# Patient Record
Sex: FEMALE | NOT HISPANIC OR LATINO | Employment: FULL TIME | URBAN - METROPOLITAN AREA
[De-identification: names, ages, dates, MRNs, and addresses within clinical notes are randomized per-mention and may not be internally consistent; named-entity substitution may affect disease eponyms.]

---

## 2017-06-20 RX ORDER — ALBUTEROL SULFATE 90 UG/1
2 AEROSOL, METERED RESPIRATORY (INHALATION) EVERY 4 HOURS PRN
Qty: 1 INHALER | Refills: 6 | Status: SHIPPED | OUTPATIENT
Start: 2017-06-20 | End: 2018-11-14 | Stop reason: HOSPADM

## 2017-11-15 ENCOUNTER — TELEPHONE (OUTPATIENT)
Dept: INTERNAL MEDICINE | Facility: CLINIC | Age: 32
End: 2017-11-15

## 2017-11-15 DIAGNOSIS — Z00.00 HEALTHCARE MAINTENANCE: Primary | ICD-10-CM

## 2017-11-15 NOTE — TELEPHONE ENCOUNTER
LABS ORDERED    ----- Message from Tsering Phillips MD sent at 11/14/2017  3:04 PM EST -----  cmp flp tsh cbc  ----- Message -----     From: Janine Fletcher MA     Sent: 11/14/2017   2:16 PM       To: Tsering Phillips MD    PT IS SCHEDULED FOR LABS PLEASE ADVISE

## 2017-11-16 LAB
ALBUMIN SERPL-MCNC: 4.4 G/DL (ref 3.5–5.2)
ALBUMIN/GLOB SERPL: 1.9 G/DL
ALP SERPL-CCNC: 47 U/L (ref 39–117)
ALT SERPL-CCNC: 22 U/L (ref 1–33)
AST SERPL-CCNC: 21 U/L (ref 1–32)
BASOPHILS # BLD AUTO: 0.03 10*3/MM3 (ref 0–0.2)
BASOPHILS NFR BLD AUTO: 0.4 % (ref 0–1.5)
BILIRUB SERPL-MCNC: 0.9 MG/DL (ref 0.1–1.2)
BUN SERPL-MCNC: 12 MG/DL (ref 6–20)
BUN/CREAT SERPL: 15.8 (ref 7–25)
CALCIUM SERPL-MCNC: 9.5 MG/DL (ref 8.6–10.5)
CHLORIDE SERPL-SCNC: 102 MMOL/L (ref 98–107)
CHOLEST SERPL-MCNC: 158 MG/DL (ref 0–200)
CO2 SERPL-SCNC: 27.5 MMOL/L (ref 22–29)
CREAT SERPL-MCNC: 0.76 MG/DL (ref 0.57–1)
EOSINOPHIL # BLD AUTO: 0.43 10*3/MM3 (ref 0–0.7)
EOSINOPHIL NFR BLD AUTO: 6.2 % (ref 0.3–6.2)
ERYTHROCYTE [DISTWIDTH] IN BLOOD BY AUTOMATED COUNT: 13.2 % (ref 11.7–13)
GFR SERPLBLD CREATININE-BSD FMLA CKD-EPI: 107 ML/MIN/1.73
GFR SERPLBLD CREATININE-BSD FMLA CKD-EPI: 88 ML/MIN/1.73
GLOBULIN SER CALC-MCNC: 2.3 GM/DL
GLUCOSE SERPL-MCNC: 90 MG/DL (ref 65–99)
HCT VFR BLD AUTO: 45.1 % (ref 35.6–45.5)
HDLC SERPL-MCNC: 86 MG/DL (ref 40–60)
HGB BLD-MCNC: 14.3 G/DL (ref 11.9–15.5)
IMM GRANULOCYTES # BLD: 0 10*3/MM3 (ref 0–0.03)
IMM GRANULOCYTES NFR BLD: 0 % (ref 0–0.5)
LDLC SERPL CALC-MCNC: 61 MG/DL (ref 0–100)
LDLC/HDLC SERPL: 0.71 {RATIO}
LYMPHOCYTES # BLD AUTO: 2.28 10*3/MM3 (ref 0.9–4.8)
LYMPHOCYTES NFR BLD AUTO: 32.7 % (ref 19.6–45.3)
MCH RBC QN AUTO: 29.9 PG (ref 26.9–32)
MCHC RBC AUTO-ENTMCNC: 31.7 G/DL (ref 32.4–36.3)
MCV RBC AUTO: 94.4 FL (ref 80.5–98.2)
MONOCYTES # BLD AUTO: 0.42 10*3/MM3 (ref 0.2–1.2)
MONOCYTES NFR BLD AUTO: 6 % (ref 5–12)
NEUTROPHILS # BLD AUTO: 3.81 10*3/MM3 (ref 1.9–8.1)
NEUTROPHILS NFR BLD AUTO: 54.7 % (ref 42.7–76)
PLATELET # BLD AUTO: 236 10*3/MM3 (ref 140–500)
POTASSIUM SERPL-SCNC: 4.5 MMOL/L (ref 3.5–5.2)
PROT SERPL-MCNC: 6.7 G/DL (ref 6–8.5)
RBC # BLD AUTO: 4.78 10*6/MM3 (ref 3.9–5.2)
SODIUM SERPL-SCNC: 141 MMOL/L (ref 136–145)
TRIGL SERPL-MCNC: 54 MG/DL (ref 0–150)
TSH SERPL DL<=0.005 MIU/L-ACNC: 1.92 MIU/ML (ref 0.27–4.2)
VLDLC SERPL CALC-MCNC: 10.8 MG/DL (ref 5–40)
WBC # BLD AUTO: 6.97 10*3/MM3 (ref 4.5–10.7)

## 2017-11-22 ENCOUNTER — OFFICE VISIT (OUTPATIENT)
Dept: INTERNAL MEDICINE | Facility: CLINIC | Age: 32
End: 2017-11-22

## 2017-11-22 VITALS
HEART RATE: 59 BPM | OXYGEN SATURATION: 97 % | SYSTOLIC BLOOD PRESSURE: 116 MMHG | WEIGHT: 136.3 LBS | BODY MASS INDEX: 21.9 KG/M2 | DIASTOLIC BLOOD PRESSURE: 78 MMHG | HEIGHT: 66 IN

## 2017-11-22 DIAGNOSIS — Z00.00 HEALTH CARE MAINTENANCE: Primary | ICD-10-CM

## 2017-11-22 PROCEDURE — 99395 PREV VISIT EST AGE 18-39: CPT | Performed by: INTERNAL MEDICINE

## 2017-11-22 NOTE — PROGRESS NOTES
"Subjective   Radha Diamond is a 32 y.o. female and is here for a comprehensive physical exam. The patient reports problems - infertility.  She is taking femara to help with getting pregnant.  She has been off OCP for 1 year      Pt is UTD with annual gyn exam and mammo sees gyn    Do you take any herbs or supplements that were not prescribed by a doctor? Prenatal vit      Social History: She has been eating a healthy diet  She does exercise reg    Social History     Social History   • Marital status:      Spouse name: KANE   • Number of children: 0   • Years of education: N/A     Occupational History   • INTERNAL MD      Social History Main Topics   • Smoking status: Never Smoker   • Smokeless tobacco: Not on file   • Alcohol use Yes      Comment: 5-7 glasses of wine weekly   • Drug use: Not on file   • Sexual activity: Yes     Partners: Male     Birth control/ protection: Ring      Comment:      Other Topics Concern   • Not on file     Social History Narrative       Family History:   Family History   Problem Relation Age of Onset   • Hyperlipidemia Mother    • Hypertension Father    • Diabetes Father    • Heart attack Maternal Grandmother    • Diabetes Maternal Grandmother    • COPD Maternal Grandmother    • Stroke Maternal Grandmother    • Macular degeneration Maternal Grandmother    • Heart attack Maternal Grandfather    • Diabetes Maternal Grandfather    • Stroke Maternal Grandfather    • Alcohol abuse Maternal Grandfather    • Breast cancer Maternal Aunt        Past Medical History:   Past Medical History:   Diagnosis Date   • Asthma    • Environmental allergies            Review of Systems    A comprehensive review of systems was negative.    Objective   /78 (BP Location: Left arm, Patient Position: Sitting, Cuff Size: Adult)  Pulse 59  Ht 66\" (167.6 cm)  Wt 136 lb 4.8 oz (61.8 kg)  SpO2 97%  BMI 22 kg/m2    General Appearance:    Alert, cooperative, no distress, appears stated age "   Head:    Normocephalic, without obvious abnormality, atraumatic   Eyes:    PERRL, conjunctiva/corneas clear, EOM's intact, fundi     benign, both eyes   Ears:    Normal TM's and external ear canals, both ears   Nose:   Nares normal, septum midline, mucosa normal, no drainage    or sinus tenderness   Throat:   Lips, mucosa, and tongue normal; teeth and gums normal   Neck:   Supple, symmetrical, trachea midline, no adenopathy;     thyroid:  no enlargement/tenderness/nodules; no carotid    bruit or JVD   Back:     Symmetric, no curvature, ROM normal, no CVA tenderness   Lungs:     Clear to auscultation bilaterally, respirations unlabored   Chest Wall:    No tenderness or deformity    Heart:    Regular rate and rhythm, S1 and S2 normal, no murmur, rub   or gallop       Abdomen:     Soft, non-tender, bowel sounds active all four quadrants,     no masses, no organomegaly           Extremities:   Extremities normal, atraumatic, no cyanosis or edema   Pulses:   2+ and symmetric all extremities   Skin:   Skin color, texture, turgor normal, no rashes or lesions   Lymph nodes:   Cervical, supraclavicular, and axillary nodes normal   Neurologic:   CNII-XII intact, normal strength, sensation and reflexes     throughout         Medications:   Current Outpatient Prescriptions:   •  albuterol (PROVENTIL HFA;VENTOLIN HFA) 108 (90 BASE) MCG/ACT inhaler, Inhale 2 puffs Every 4 (Four) Hours As Needed for Wheezing., Disp: 1 inhaler, Rfl: 6  •  ALLERGY SERUM INJECTION, Inject  under the skin 1 (One) Time Per Week., Disp: , Rfl:   •  EPINEPHrine (EPIPEN) 0.3 MG/0.3ML solution auto-injector injection, 1 (One) Time., Disp: , Rfl:   •  fluticasone (FLONASE) 50 MCG/ACT nasal spray, 2 sprays into each nostril Daily., Disp: , Rfl:   •  Letrozole (FEMARA PO), Take 5 mg by mouth Daily As Needed (days 3-7 of cycle)., Disp: , Rfl:   •  Progesterone 200 MG suppository, Insert 200 mg into the vagina At Night As Needed (after positive opk result).,  Disp: , Rfl:        Assessment/Plan   Healthy female exam.      1. Healthcare Maintenance:  2. Patient Counseling:  --Nutrition: Stressed importance of moderation in sodium/caffeine intake, saturated fat and cholesterol, caloric balance, sufficient intake of fresh fruits, vegetables, fiber, calcium and vit D  --Exercise: she does cardio and strenght  --Substance Abuse: no tob occa etoh  --Dental health: she does go to the dentist reg  --Immunizations reviewed. She has had her flu shot  --Discussed benefits of screening colonoscopy due at age 50  3. Infertility-  She is seeing gyn about this

## 2018-01-11 RX ORDER — OSELTAMIVIR PHOSPHATE 75 MG/1
75 CAPSULE ORAL DAILY
Qty: 10 CAPSULE | Refills: 0 | Status: SHIPPED | OUTPATIENT
Start: 2018-01-11 | End: 2018-11-14 | Stop reason: HOSPADM

## 2018-04-06 DIAGNOSIS — Z32.01 POSITIVE PREGNANCY TEST: Primary | ICD-10-CM

## 2018-04-07 LAB
ABO GROUP BLD: NORMAL
RH BLD: POSITIVE

## 2018-04-08 NOTE — PROGRESS NOTES
Order from Dr. Ellsworth were placed under my name as lab slip was not compatible with [facility]. See order from scanned into media from Dr. Sienna Ellsworth. Results were faxed to her office.

## 2018-04-09 DIAGNOSIS — Z32.01 POSITIVE PREGNANCY TEST: Primary | ICD-10-CM

## 2018-04-09 LAB
HCG INTACT+B SERPL-ACNC: NORMAL MIU/ML
HCG INTACT+B SERPL-ACNC: NORMAL MIU/ML
Lab: NORMAL
WRITTEN AUTHORIZATION: NORMAL

## 2018-04-30 LAB
EXTERNAL HEPATITIS B SURFACE ANTIGEN: NEGATIVE
EXTERNAL HEPATITIS C AB: NORMAL
EXTERNAL RUBELLA QUALITATIVE: NORMAL
EXTERNAL SYPHILIS RPR SCREEN: NORMAL
HIV1 P24 AG SERPL QL IA: NORMAL

## 2018-10-24 LAB — EXTERNAL GROUP B STREP ANTIGEN: NEGATIVE

## 2018-11-10 ENCOUNTER — HOSPITAL ENCOUNTER (INPATIENT)
Facility: HOSPITAL | Age: 33
LOS: 4 days | Discharge: HOME | End: 2018-11-14
Attending: OBSTETRICS & GYNECOLOGY | Admitting: OBSTETRICS & GYNECOLOGY

## 2018-11-10 DIAGNOSIS — Z34.90 PREGNANCY, UNSPECIFIED GESTATIONAL AGE: Primary | ICD-10-CM

## 2018-11-10 LAB
ABO GROUP BLD: NORMAL
BASOPHILS # BLD AUTO: 0.04 10*3/MM3 (ref 0–0.2)
BASOPHILS NFR BLD AUTO: 0.4 % (ref 0–1.5)
BLD GP AB SCN SERPL QL: NEGATIVE
DEPRECATED RDW RBC AUTO: 42.5 FL (ref 37–54)
EOSINOPHIL # BLD AUTO: 0.33 10*3/MM3 (ref 0–0.7)
EOSINOPHIL NFR BLD AUTO: 3.3 % (ref 0.3–6.2)
ERYTHROCYTE [DISTWIDTH] IN BLOOD BY AUTOMATED COUNT: 12.7 % (ref 11.7–13)
HCT VFR BLD AUTO: 39.2 % (ref 35.6–45.5)
HGB BLD-MCNC: 12.9 G/DL (ref 11.9–15.5)
IMM GRANULOCYTES # BLD: 0.04 10*3/MM3 (ref 0–0.03)
IMM GRANULOCYTES NFR BLD: 0.4 % (ref 0–0.5)
LYMPHOCYTES # BLD AUTO: 2.91 10*3/MM3 (ref 0.9–4.8)
LYMPHOCYTES NFR BLD AUTO: 28.9 % (ref 19.6–45.3)
MCH RBC QN AUTO: 30.6 PG (ref 26.9–32)
MCHC RBC AUTO-ENTMCNC: 32.9 G/DL (ref 32.4–36.3)
MCV RBC AUTO: 93.1 FL (ref 80.5–98.2)
MONOCYTES # BLD AUTO: 0.65 10*3/MM3 (ref 0.2–1.2)
MONOCYTES NFR BLD AUTO: 6.5 % (ref 5–12)
NEUTROPHILS # BLD AUTO: 6.1 10*3/MM3 (ref 1.9–8.1)
NEUTROPHILS NFR BLD AUTO: 60.5 % (ref 42.7–76)
PLATELET # BLD AUTO: 208 10*3/MM3 (ref 140–500)
PMV BLD AUTO: 11.3 FL (ref 6–12)
RBC # BLD AUTO: 4.21 10*6/MM3 (ref 3.9–5.2)
RH BLD: POSITIVE
T&S EXPIRATION DATE: NORMAL
WBC NRBC COR # BLD: 10.07 10*3/MM3 (ref 4.5–10.7)

## 2018-11-10 PROCEDURE — 85025 COMPLETE CBC W/AUTO DIFF WBC: CPT | Performed by: OBSTETRICS & GYNECOLOGY

## 2018-11-10 PROCEDURE — 86901 BLOOD TYPING SEROLOGIC RH(D): CPT | Performed by: OBSTETRICS & GYNECOLOGY

## 2018-11-10 PROCEDURE — 86900 BLOOD TYPING SEROLOGIC ABO: CPT | Performed by: OBSTETRICS & GYNECOLOGY

## 2018-11-10 PROCEDURE — 86850 RBC ANTIBODY SCREEN: CPT | Performed by: OBSTETRICS & GYNECOLOGY

## 2018-11-10 RX ORDER — SODIUM CHLORIDE 0.9 % (FLUSH) 0.9 %
3-10 SYRINGE (ML) INJECTION AS NEEDED
Status: DISCONTINUED | OUTPATIENT
Start: 2018-11-10 | End: 2018-11-12 | Stop reason: HOSPADM

## 2018-11-10 RX ORDER — MISOPROSTOL 200 UG/1
800 TABLET ORAL AS NEEDED
Status: CANCELLED | OUTPATIENT
Start: 2018-11-10

## 2018-11-10 RX ORDER — ONDANSETRON 2 MG/ML
4 INJECTION INTRAMUSCULAR; INTRAVENOUS EVERY 6 HOURS PRN
Status: DISCONTINUED | OUTPATIENT
Start: 2018-11-10 | End: 2018-11-12 | Stop reason: HOSPADM

## 2018-11-10 RX ORDER — ACETAMINOPHEN 325 MG/1
650 TABLET ORAL EVERY 4 HOURS PRN
Status: DISCONTINUED | OUTPATIENT
Start: 2018-11-10 | End: 2018-11-12 | Stop reason: HOSPADM

## 2018-11-10 RX ORDER — SODIUM CHLORIDE 0.9 % (FLUSH) 0.9 %
3 SYRINGE (ML) INJECTION EVERY 12 HOURS SCHEDULED
Status: DISCONTINUED | OUTPATIENT
Start: 2018-11-10 | End: 2018-11-12 | Stop reason: HOSPADM

## 2018-11-10 RX ORDER — CARBOPROST TROMETHAMINE 250 UG/ML
250 INJECTION, SOLUTION INTRAMUSCULAR AS NEEDED
Status: CANCELLED | OUTPATIENT
Start: 2018-11-10

## 2018-11-10 RX ORDER — OXYTOCIN-SODIUM CHLORIDE 0.9% IV SOLN 30 UNIT/500ML 30-0.9/5 UT/ML-%
999 SOLUTION INTRAVENOUS ONCE
Status: CANCELLED | OUTPATIENT
Start: 2018-11-10

## 2018-11-10 RX ORDER — OXYTOCIN-SODIUM CHLORIDE 0.9% IV SOLN 30 UNIT/500ML 30-0.9/5 UT/ML-%
125 SOLUTION INTRAVENOUS CONTINUOUS PRN
Status: CANCELLED | OUTPATIENT
Start: 2018-11-11

## 2018-11-10 RX ORDER — LIDOCAINE HYDROCHLORIDE 10 MG/ML
5 INJECTION, SOLUTION EPIDURAL; INFILTRATION; INTRACAUDAL; PERINEURAL AS NEEDED
Status: DISCONTINUED | OUTPATIENT
Start: 2018-11-10 | End: 2018-11-12 | Stop reason: HOSPADM

## 2018-11-10 RX ORDER — FAMOTIDINE 10 MG/ML
20 INJECTION, SOLUTION INTRAVENOUS EVERY 12 HOURS SCHEDULED
Status: DISCONTINUED | OUTPATIENT
Start: 2018-11-10 | End: 2018-11-12 | Stop reason: HOSPADM

## 2018-11-10 RX ORDER — OXYTOCIN-SODIUM CHLORIDE 0.9% IV SOLN 30 UNIT/500ML 30-0.9/5 UT/ML-%
250 SOLUTION INTRAVENOUS CONTINUOUS
Status: CANCELLED | OUTPATIENT
Start: 2018-11-10 | End: 2018-11-11

## 2018-11-10 RX ORDER — ONDANSETRON 4 MG/1
4 TABLET, FILM COATED ORAL EVERY 6 HOURS PRN
Status: DISCONTINUED | OUTPATIENT
Start: 2018-11-10 | End: 2018-11-12 | Stop reason: HOSPADM

## 2018-11-10 RX ORDER — FAMOTIDINE 20 MG/1
20 TABLET, FILM COATED ORAL EVERY 12 HOURS SCHEDULED
Status: DISCONTINUED | OUTPATIENT
Start: 2018-11-10 | End: 2018-11-12 | Stop reason: HOSPADM

## 2018-11-10 RX ORDER — METHYLERGONOVINE MALEATE 0.2 MG/ML
200 INJECTION INTRAVENOUS ONCE AS NEEDED
Status: CANCELLED | OUTPATIENT
Start: 2018-11-10

## 2018-11-10 RX ORDER — ONDANSETRON 4 MG/1
4 TABLET, ORALLY DISINTEGRATING ORAL EVERY 6 HOURS PRN
Status: DISCONTINUED | OUTPATIENT
Start: 2018-11-10 | End: 2018-11-12 | Stop reason: HOSPADM

## 2018-11-11 ENCOUNTER — ANESTHESIA EVENT (OUTPATIENT)
Dept: LABOR AND DELIVERY | Facility: HOSPITAL | Age: 33
End: 2018-11-11

## 2018-11-11 ENCOUNTER — ANESTHESIA (OUTPATIENT)
Dept: LABOR AND DELIVERY | Facility: HOSPITAL | Age: 33
End: 2018-11-11

## 2018-11-11 VITALS — DIASTOLIC BLOOD PRESSURE: 110 MMHG | OXYGEN SATURATION: 100 % | HEART RATE: 74 BPM | SYSTOLIC BLOOD PRESSURE: 136 MMHG

## 2018-11-11 LAB
ATMOSPHERIC PRESS: 758.7 MMHG
ATMOSPHERIC PRESS: 761.8 MMHG
BASE EXCESS BLDCOA CALC-SCNC: -9.8 MMOL/L
BASE EXCESS BLDCOV CALC-SCNC: -8.9 MMOL/L (ref -30–30)
BDY SITE: ABNORMAL
BDY SITE: ABNORMAL
HCO3 BLDCOA-SCNC: 24.1 MMOL/L (ref 22–28)
HCO3 BLDCOV-SCNC: 23.8 MMOL/L
MODALITY: ABNORMAL
MODALITY: ABNORMAL
NOTE: ABNORMAL
NOTE: ABNORMAL
PCO2 BLDCOA: 94.5 MMHG
PCO2 BLDCOV: 82 MM HG (ref 35–51.3)
PH BLDCOA: 7.01 PH UNITS (ref 7.18–7.34)
PH BLDCOV: 7.07 PH UNITS (ref 7.26–7.4)
PO2 BLDCOA: <10.9 MMHG (ref 12–26)
PO2 BLDCOV: 17.1 MM HG (ref 19–39)
SAO2 % BLDCOA: 12.9 % (ref 92–99)
SAO2 % BLDCOA: 3.7 % (ref 92–99)
SAO2 % BLDCOV: ABNORMAL %

## 2018-11-11 PROCEDURE — 88307 TISSUE EXAM BY PATHOLOGIST: CPT

## 2018-11-11 PROCEDURE — 25010000002 HYDROMORPHONE PER 4 MG: Performed by: ANESTHESIOLOGY

## 2018-11-11 PROCEDURE — 3E0E77Z INTRODUCTION OF ELECTROLYTIC AND WATER BALANCE SUBSTANCE INTO PRODUCTS OF CONCEPTION, VIA NATURAL OR ARTIFICIAL OPENING: ICD-10-PCS | Performed by: OBSTETRICS & GYNECOLOGY

## 2018-11-11 PROCEDURE — C1755 CATHETER, INTRASPINAL: HCPCS | Performed by: ANESTHESIOLOGY

## 2018-11-11 PROCEDURE — 25010000002 ONDANSETRON PER 1 MG: Performed by: OBSTETRICS & GYNECOLOGY

## 2018-11-11 PROCEDURE — 25010000002 MORPHINE PER 10 MG: Performed by: ANESTHESIOLOGY

## 2018-11-11 PROCEDURE — 25010000002 ROPIVACAINE PER 1 MG: Performed by: ANESTHESIOLOGY

## 2018-11-11 PROCEDURE — 82803 BLOOD GASES ANY COMBINATION: CPT

## 2018-11-11 PROCEDURE — 25010000003 CEFAZOLIN IN DEXTROSE 2-4 GM/100ML-% SOLUTION: Performed by: OBSTETRICS & GYNECOLOGY

## 2018-11-11 RX ORDER — MISOPROSTOL 200 UG/1
800 TABLET ORAL AS NEEDED
Status: DISCONTINUED | OUTPATIENT
Start: 2018-11-11 | End: 2018-11-12 | Stop reason: HOSPADM

## 2018-11-11 RX ORDER — OXYTOCIN-SODIUM CHLORIDE 0.9% IV SOLN 30 UNIT/500ML 30-0.9/5 UT/ML-%
250 SOLUTION INTRAVENOUS CONTINUOUS
Status: DISPENSED | OUTPATIENT
Start: 2018-11-11 | End: 2018-11-11

## 2018-11-11 RX ORDER — EPHEDRINE SULFATE 50 MG/ML
5 INJECTION, SOLUTION INTRAVENOUS AS NEEDED
Status: DISCONTINUED | OUTPATIENT
Start: 2018-11-11 | End: 2018-11-12 | Stop reason: HOSPADM

## 2018-11-11 RX ORDER — CEFAZOLIN SODIUM 2 G/100ML
2 INJECTION, SOLUTION INTRAVENOUS ONCE
Status: COMPLETED | OUTPATIENT
Start: 2018-11-11 | End: 2018-11-11

## 2018-11-11 RX ORDER — SODIUM CHLORIDE, SODIUM LACTATE, POTASSIUM CHLORIDE, CALCIUM CHLORIDE 600; 310; 30; 20 MG/100ML; MG/100ML; MG/100ML; MG/100ML
125 INJECTION, SOLUTION INTRAVENOUS CONTINUOUS
Status: DISCONTINUED | OUTPATIENT
Start: 2018-11-11 | End: 2018-11-14 | Stop reason: HOSPADM

## 2018-11-11 RX ORDER — NALOXONE HCL 0.4 MG/ML
0.2 VIAL (ML) INJECTION
Status: CANCELLED | OUTPATIENT
Start: 2018-11-11

## 2018-11-11 RX ORDER — ROPIVACAINE HYDROCHLORIDE 2 MG/ML
INJECTION, SOLUTION EPIDURAL; INFILTRATION; PERINEURAL AS NEEDED
Status: DISCONTINUED | OUTPATIENT
Start: 2018-11-11 | End: 2018-11-11 | Stop reason: SURG

## 2018-11-11 RX ORDER — ERYTHROMYCIN 5 MG/G
OINTMENT OPHTHALMIC
Status: DISPENSED
Start: 2018-11-11 | End: 2018-11-12

## 2018-11-11 RX ORDER — MORPHINE SULFATE 1 MG/ML
INJECTION, SOLUTION EPIDURAL; INTRATHECAL; INTRAVENOUS
Status: COMPLETED
Start: 2018-11-11 | End: 2018-11-11

## 2018-11-11 RX ORDER — LIDOCAINE HYDROCHLORIDE 10 MG/ML
5 INJECTION, SOLUTION EPIDURAL; INFILTRATION; INTRACAUDAL; PERINEURAL AS NEEDED
Status: DISCONTINUED | OUTPATIENT
Start: 2018-11-11 | End: 2018-11-12 | Stop reason: HOSPADM

## 2018-11-11 RX ORDER — PHYTONADIONE 1 MG/.5ML
INJECTION, EMULSION INTRAMUSCULAR; INTRAVENOUS; SUBCUTANEOUS
Status: DISPENSED
Start: 2018-11-11 | End: 2018-11-12

## 2018-11-11 RX ORDER — FAMOTIDINE 10 MG/ML
20 INJECTION, SOLUTION INTRAVENOUS ONCE AS NEEDED
Status: DISCONTINUED | OUTPATIENT
Start: 2018-11-11 | End: 2018-11-12 | Stop reason: HOSPADM

## 2018-11-11 RX ORDER — SODIUM CHLORIDE 0.9 % (FLUSH) 0.9 %
3-10 SYRINGE (ML) INJECTION AS NEEDED
Status: DISCONTINUED | OUTPATIENT
Start: 2018-11-11 | End: 2018-11-12 | Stop reason: HOSPADM

## 2018-11-11 RX ORDER — ONDANSETRON 2 MG/ML
4 INJECTION INTRAMUSCULAR; INTRAVENOUS ONCE AS NEEDED
Status: DISCONTINUED | OUTPATIENT
Start: 2018-11-11 | End: 2018-11-12 | Stop reason: HOSPADM

## 2018-11-11 RX ORDER — OXYTOCIN-SODIUM CHLORIDE 0.9% IV SOLN 30 UNIT/500ML 30-0.9/5 UT/ML-%
999 SOLUTION INTRAVENOUS ONCE
Status: DISCONTINUED | OUTPATIENT
Start: 2018-11-11 | End: 2018-11-12 | Stop reason: HOSPADM

## 2018-11-11 RX ORDER — CARBOPROST TROMETHAMINE 250 UG/ML
250 INJECTION, SOLUTION INTRAMUSCULAR AS NEEDED
Status: DISCONTINUED | OUTPATIENT
Start: 2018-11-11 | End: 2018-11-12 | Stop reason: HOSPADM

## 2018-11-11 RX ORDER — DEXTROSE, SODIUM CHLORIDE, SODIUM LACTATE, POTASSIUM CHLORIDE, AND CALCIUM CHLORIDE 5; .6; .31; .03; .02 G/100ML; G/100ML; G/100ML; G/100ML; G/100ML
125 INJECTION, SOLUTION INTRAVENOUS CONTINUOUS
Status: ACTIVE | OUTPATIENT
Start: 2018-11-11 | End: 2018-11-12

## 2018-11-11 RX ORDER — LIDOCAINE HYDROCHLORIDE AND EPINEPHRINE 20; 5 MG/ML; UG/ML
INJECTION, SOLUTION EPIDURAL; INFILTRATION; INTRACAUDAL; PERINEURAL AS NEEDED
Status: DISCONTINUED | OUTPATIENT
Start: 2018-11-11 | End: 2018-11-11 | Stop reason: SURG

## 2018-11-11 RX ORDER — SODIUM CHLORIDE 0.9 % (FLUSH) 0.9 %
3 SYRINGE (ML) INJECTION EVERY 12 HOURS SCHEDULED
Status: DISCONTINUED | OUTPATIENT
Start: 2018-11-11 | End: 2018-11-12 | Stop reason: HOSPADM

## 2018-11-11 RX ORDER — OXYTOCIN-SODIUM CHLORIDE 0.9% IV SOLN 30 UNIT/500ML 30-0.9/5 UT/ML-%
2-30 SOLUTION INTRAVENOUS
Status: DISCONTINUED | OUTPATIENT
Start: 2018-11-11 | End: 2018-11-14 | Stop reason: HOSPADM

## 2018-11-11 RX ORDER — MORPHINE SULFATE 1 MG/ML
INJECTION, SOLUTION EPIDURAL; INTRATHECAL; INTRAVENOUS AS NEEDED
Status: DISCONTINUED | OUTPATIENT
Start: 2018-11-11 | End: 2018-11-11 | Stop reason: SURG

## 2018-11-11 RX ORDER — METHYLERGONOVINE MALEATE 0.2 MG/ML
200 INJECTION INTRAVENOUS AS NEEDED
Status: DISCONTINUED | OUTPATIENT
Start: 2018-11-11 | End: 2018-11-12 | Stop reason: HOSPADM

## 2018-11-11 RX ORDER — OXYTOCIN-SODIUM CHLORIDE 0.9% IV SOLN 30 UNIT/500ML 30-0.9/5 UT/ML-%
125 SOLUTION INTRAVENOUS CONTINUOUS PRN
Status: COMPLETED | OUTPATIENT
Start: 2018-11-12 | End: 2018-11-11

## 2018-11-11 RX ORDER — HYDROMORPHONE HYDROCHLORIDE 1 MG/ML
0.5 INJECTION, SOLUTION INTRAMUSCULAR; INTRAVENOUS; SUBCUTANEOUS
Status: DISPENSED | OUTPATIENT
Start: 2018-11-11 | End: 2018-11-12

## 2018-11-11 RX ORDER — SODIUM CHLORIDE 9 MG/ML
100 INJECTION, SOLUTION INTRAVENOUS CONTINUOUS
Status: DISCONTINUED | OUTPATIENT
Start: 2018-11-11 | End: 2018-11-14 | Stop reason: HOSPADM

## 2018-11-11 RX ADMIN — SUFENTANIL CITRATE 10 ML: 50 INJECTION EPIDURAL; INTRAVENOUS at 19:47

## 2018-11-11 RX ADMIN — SODIUM CHLORIDE 300 ML: 9 INJECTION, SOLUTION INTRAVENOUS at 15:21

## 2018-11-11 RX ADMIN — AZITHROMYCIN DIHYDRATE 500 MG: 500 INJECTION, POWDER, LYOPHILIZED, FOR SOLUTION INTRAVENOUS at 22:39

## 2018-11-11 RX ADMIN — OXYTOCIN 999 ML/HR: 10 INJECTION, SOLUTION INTRAMUSCULAR; INTRAVENOUS at 22:19

## 2018-11-11 RX ADMIN — LIDOCAINE HYDROCHLORIDE,EPINEPHRINE BITARTRATE 15 ML: 20; .005 INJECTION, SOLUTION EPIDURAL; INFILTRATION; INTRACAUDAL; PERINEURAL at 22:00

## 2018-11-11 RX ADMIN — ONDANSETRON 4 MG: 2 INJECTION INTRAMUSCULAR; INTRAVENOUS at 22:22

## 2018-11-11 RX ADMIN — SODIUM CHLORIDE, POTASSIUM CHLORIDE, SODIUM LACTATE AND CALCIUM CHLORIDE: 600; 310; 30; 20 INJECTION, SOLUTION INTRAVENOUS at 22:15

## 2018-11-11 RX ADMIN — SODIUM CHLORIDE, POTASSIUM CHLORIDE, SODIUM LACTATE AND CALCIUM CHLORIDE 125 ML/HR: 600; 310; 30; 20 INJECTION, SOLUTION INTRAVENOUS at 10:39

## 2018-11-11 RX ADMIN — SODIUM CHLORIDE 300 ML: 9 INJECTION, SOLUTION INTRAVENOUS at 19:05

## 2018-11-11 RX ADMIN — OXYTOCIN 2 MILLI-UNITS/MIN: 10 INJECTION, SOLUTION INTRAMUSCULAR; INTRAVENOUS at 10:40

## 2018-11-11 RX ADMIN — SODIUM CHLORIDE, POTASSIUM CHLORIDE, SODIUM LACTATE AND CALCIUM CHLORIDE 125 ML/HR: 600; 310; 30; 20 INJECTION, SOLUTION INTRAVENOUS at 21:39

## 2018-11-11 RX ADMIN — SODIUM CHLORIDE, POTASSIUM CHLORIDE, SODIUM LACTATE AND CALCIUM CHLORIDE 999 ML/HR: 600; 310; 30; 20 INJECTION, SOLUTION INTRAVENOUS at 19:05

## 2018-11-11 RX ADMIN — ROPIVACAINE HYDROCHLORIDE 4 ML: 2 INJECTION, SOLUTION EPIDURAL; INFILTRATION at 19:41

## 2018-11-11 RX ADMIN — MORPHINE SULFATE 5 MG: 1 INJECTION EPIDURAL; INTRATHECAL; INTRAVENOUS at 22:47

## 2018-11-11 RX ADMIN — ROPIVACAINE HYDROCHLORIDE 4 ML: 2 INJECTION, SOLUTION EPIDURAL; INFILTRATION at 19:43

## 2018-11-11 RX ADMIN — SODIUM CHLORIDE, SODIUM LACTATE, POTASSIUM CHLORIDE, CALCIUM CHLORIDE AND DEXTROSE MONOHYDRATE 125 ML/HR: 5; 600; 310; 30; 20 INJECTION, SOLUTION INTRAVENOUS at 15:29

## 2018-11-11 RX ADMIN — ROPIVACAINE HYDROCHLORIDE 5 ML: 2 INJECTION, SOLUTION EPIDURAL; INFILTRATION at 19:39

## 2018-11-11 RX ADMIN — Medication 3 ML: at 07:19

## 2018-11-11 RX ADMIN — HYDROMORPHONE HYDROCHLORIDE 0.5 MG: 1 INJECTION, SOLUTION INTRAMUSCULAR; INTRAVENOUS; SUBCUTANEOUS at 23:43

## 2018-11-11 RX ADMIN — CEFAZOLIN SODIUM 2 G: 2 INJECTION, SOLUTION INTRAVENOUS at 21:57

## 2018-11-11 RX ADMIN — OXYTOCIN 125 ML/HR: 10 INJECTION, SOLUTION INTRAMUSCULAR; INTRAVENOUS at 23:45

## 2018-11-11 NOTE — NURSING NOTE
RI To Dr Meyer. Informed that pt came in last evening around 9pm, SROM around 230pm yesterday, afebrile. Pt has not been check since around 10pm yesterday. Spontaneous decel noted after going to [facility] last time, pt asking for intermittent monitoring and ability to go walking.FHR after decel returned to baseline and was reactive. Noted that pt is currently having a decel. MD on her way to LD now and will discuss with pt. Note started at 0915.  Johnnie Morris RN

## 2018-11-11 NOTE — NURSING NOTE
Pt turned to left side. Pt had been standing at bedside. I discussed with pt and  the decels and position changes to help improve those. I also discussed possibility of amnioinfusion to help cord buyoncy

## 2018-11-11 NOTE — NURSING NOTE
Dr Meyer at desk. Noted that pitocin orders have been rec'd, RN about to start. MD does not want sve performed prior to pitocin aug.  Johnnie Morris RN

## 2018-11-11 NOTE — H&P
[facility]  Obstetric History and Physical    Chief Complaint   Patient presents with   • Rupture of Membranes     rupture at 230pm, clar fluid, large amount, no odor, no vaginal bleeding.       Subjective     Patient is a 33 y.o. female  currently at 38w1d, who presents with ruptured membranes and few contractions.  Membranes ruptured about 1430 today.    Her prenatal care is benign.  Her previous obstetric/gynecological history is noted for is non-contributory.    The following portions of the patients history were reviewed and updated as appropriate: current medications, allergies, past medical history, past surgical history, past family history and past social history .       Prenatal Information:  Prenatal Results     Initial Prenatal Labs     Test Value Reference Range Date Time    Hemoglobin        Hematocrit        Platelets 236 10*3/mm3 140 - 500 10*3/mm3 11/15/17 1530    Rubella IgG        Hepatitis B SAg        Hepatitis C Ab        RPR        ABO O   18 1334    Rh Positive   18 1334    Antibody Screen        HIV        Urine Culture        Gonorrhea        Chlamydia        TSH 1.92 mIU/mL 0.27 - 4.2 mIU/mL 11/15/17 1530          2nd and 3rd Trimester     Test Value Reference Range Date Time    Hemoglobin (repeated)        Hematocrit (repeated)        GCT        Antibody Screen (repeated)        GTT Fasting        GTT 1 Hr        GTT 2 Hr        GTT 3 Hr        Group B Strep              Drug Screening     Test Value Reference Range Date Time    Amphetamine Screen        Barbiturate Screen        Benzodiazepine Screen        Methadone Screen        Phencyclidine Screen        Opiates Screen        THC Screen        Cocaine Screen        Propoxyphene Screen        Buprenorphine Screen        Methamphetamine Screen        Oxycodone Screen        Tryicyclic Antidepressants Screen              Other (Risk screening)     Test Value Reference Range Date Time    Varicella IgG        Parvovirus  IgG        CMV IgG        Cystic Fibrosis        Hemoglobin electrophoresis        NIPT        MSAFP-4        AFP (for NTD only)                  External Prenatal Results     Pregnancy Outside Results - Transcribed From Office Records - See Scanned Records For Details     Test Value Date Time    Hgb 14.3 g/dL 11/15/17 1530    Hct 45.1 % 11/15/17 1530    ABO O  18 1334    Rh Positive  18 1334    Antibody Screen       Glucose Fasting GTT       Glucose Tolerance Test 1 hour       Glucose Tolerance Test 3 hour       Gonorrhea (discrete)       Chlamydia (discrete)       RPR       VDRL       Syphilis Antibody       Rubella       HBsAg       Herpes Simplex Virus PCR       Herpes Simplex VIrus Culture       HIV       Hep C RNA Quant PCR       Hep C Antibody       AFP       Group B Strep       GBS Susceptibility to Clindamycin       GBS Susceptibility to Erythromycin       Fetal Fibronectin       Genetic Testing, Maternal Blood             Drug Screening     Test Value Date Time    Urine Drug Screen       Amphetamine Screen       Barbiturate Screen       Benzodiazepine Screen       Methadone Screen       Phencyclidine Screen       Opiates Screen       THC Screen       Cocaine Screen       Propoxyphene Screen       Buprenorphine Screen       Methamphetamine Screen       Oxycodone Screen       Tricyclic Antidepressants Screen                    Past OB History:     Obstetric History       T0      L0     SAB0   TAB0   Ectopic0   Molar0   Multiple0   Live Births0       # Outcome Date GA Lbr Vikas/2nd Weight Sex Delivery Anes PTL Lv   1 Current                   Past Medical History: Past Medical History:   Diagnosis Date   • Asthma    • Environmental allergies       Past Surgical History Past Surgical History:   Procedure Laterality Date   • ENDOSCOPY     • HYSTEROSALPINGOGRAM  2017   • SINUS SURGERY     • WISDOM TOOTH EXTRACTION        Family History: Family History   Problem Relation Age of Onset    • Hyperlipidemia Mother    • Hypertension Father    • Diabetes Father    • Heart attack Maternal Grandmother    • Diabetes Maternal Grandmother    • COPD Maternal Grandmother    • Stroke Maternal Grandmother    • Macular degeneration Maternal Grandmother    • Heart attack Maternal Grandfather    • Diabetes Maternal Grandfather    • Stroke Maternal Grandfather    • Alcohol abuse Maternal Grandfather    • Breast cancer Maternal Aunt       Social History:  reports that  has never smoked. she has never used smokeless tobacco.   reports that she drinks alcohol.   reports that she does not use drugs.        General ROS: Pertinent items are noted in HPI    Objective       Vital Signs Range for the last 24 hours  Temperature: Temp:  [97.9 °F (36.6 °C)] 97.9 °F (36.6 °C)   Temp Source: Temp src: Oral   BP: BP: (113-147)/(58-88) 114/58   Pulse: Heart Rate:  [52-60] 52   Respirations: Resp:  [16] 16   SPO2:     O2 Amount (l/min):     O2 Devices     Weight: Weight:  [81.2 kg (179 lb)-81.9 kg (180 lb 10.3 oz)] 81.9 kg (180 lb 10.3 oz)     Physical Examination: General appearance - alert, well appearing, and in no distress  Chest - clear to auscultation, no wheezes, rales or rhonchi, symmetric air entry  Heart - normal rate, regular rhythm, normal S1, S2, no murmurs, rubs, clicks or gallops  Abdomen - soft, nontender, gravid, no masses or organomegaly  Pelvic - normal external genitalia, vulva, vagina, cervix, uterus and adnexa    Presentation: cephalic   Cervix: Exam by: Method: sterile exam per RN   Dilation: Cervical Dilation (cm): 0-1   Effacement: Cervical Effacement: 50%   Station:         Fetal Heart Rate Assessment   Method: Fetal HR Assessment Method: external   Beats/min: Fetal HR (beats/min): 125   Baseline: Fetal Heart Baseline Rate: normal range   Variability: Fetal HR Variability: moderate (amplitude range 6 to 25 bpm)   Accels: Fetal HR Accelerations: greater than/equal to 15 bpm, lasting at least 15 seconds    Decels: Fetal HR Decelerations: absent   Tracing Category:       Uterine Assessment   Method: Method: external tocotransducer   Frequency (min): Contraction Frequency (Minutes): every 14 minute at home   Ctx Count in 10 min:     Duration:     Intensity: Contraction Intensity: no contractions   Intensity by IUPC:     Resting Tone: Uterine Resting Tone: soft by palpation   Resting Tone by IUPC:     Tito Units:           Assessment/Plan       Pregnancy        Assessment:  1.  Intrauterine pregnancy at 38w1d gestation with reassuring fetal status.    2.  Ruptured membranes, not in labor, unfavorable cervix, hoping for unmedicated birth.  3.  Obstetrical history significant for is non-contributory.  4.  GBS status: Negative    Plan:  1. Admit, may ambulate  2. Plan of care has been reviewed with patient and .  3.  Risks, benefits of treatment plan have been discussed.  4.  All questions have been answered.      Merly Rivas MD  11/10/2018  11:58 PM

## 2018-11-11 NOTE — PROGRESS NOTES
SROM yesterday at 230pm. Only cervical exam on admission was fingertip to 1.    Not sadaf. Tried nipple stim overnight but didn't do much.  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    gbs neg- per cdc guidelines, no antibx at this time even though ROM over 18 hrs.    fht- cat 1. Overnight had a couple minor variables when standing.    toco- rare contn.      Long d/w pt/fob. Even though they have a birth plan wanting unmedicated and noninterventional as much as possible but both have ultimate goal of healthy mom and healthy baby and understand that often need medical intervention. Not opposed to epidural but wants to try other things first.    D/w recommend pitocin to get into labor. D/w and decided not to examine cervix since do not expect cervical change since not contracted all night.

## 2018-11-11 NOTE — PROGRESS NOTES
Still leaking fluid.     fht- run of repetetive fht decels- more late. Good variability.    Pit turned off, started oxyen.    cvx 2+/70/-2/ mid/ soft.... IUPC placed.      FHT decels- D/w pt/fob pathophys of fetal monitoring. Will try corrective measures. amnio infusion. D/w if fetal intolerance this early in labor, concerning for need for csection. Once recovers for 30 min, will try to restart pit

## 2018-11-12 LAB
BASOPHILS # BLD AUTO: 0.02 10*3/MM3 (ref 0–0.2)
BASOPHILS NFR BLD AUTO: 0.1 % (ref 0–1.5)
DEPRECATED RDW RBC AUTO: 43.7 FL (ref 37–54)
EOSINOPHIL # BLD AUTO: 0.01 10*3/MM3 (ref 0–0.7)
EOSINOPHIL NFR BLD AUTO: 0.1 % (ref 0.3–6.2)
ERYTHROCYTE [DISTWIDTH] IN BLOOD BY AUTOMATED COUNT: 12.8 % (ref 11.7–13)
HCT VFR BLD AUTO: 38.3 % (ref 35.6–45.5)
HGB BLD-MCNC: 12.2 G/DL (ref 11.9–15.5)
IMM GRANULOCYTES # BLD: 0.07 10*3/MM3 (ref 0–0.03)
IMM GRANULOCYTES NFR BLD: 0.4 % (ref 0–0.5)
LYMPHOCYTES # BLD AUTO: 1.8 10*3/MM3 (ref 0.9–4.8)
LYMPHOCYTES NFR BLD AUTO: 10 % (ref 19.6–45.3)
MCH RBC QN AUTO: 30 PG (ref 26.9–32)
MCHC RBC AUTO-ENTMCNC: 31.9 G/DL (ref 32.4–36.3)
MCV RBC AUTO: 94.3 FL (ref 80.5–98.2)
MONOCYTES # BLD AUTO: 1.13 10*3/MM3 (ref 0.2–1.2)
MONOCYTES NFR BLD AUTO: 6.3 % (ref 5–12)
NEUTROPHILS # BLD AUTO: 15.05 10*3/MM3 (ref 1.9–8.1)
NEUTROPHILS NFR BLD AUTO: 83.1 % (ref 42.7–76)
PLATELET # BLD AUTO: 167 10*3/MM3 (ref 140–500)
PMV BLD AUTO: 10.8 FL (ref 6–12)
RBC # BLD AUTO: 4.06 10*6/MM3 (ref 3.9–5.2)
WBC NRBC COR # BLD: 18.08 10*3/MM3 (ref 4.5–10.7)

## 2018-11-12 PROCEDURE — 85025 COMPLETE CBC W/AUTO DIFF WBC: CPT | Performed by: OBSTETRICS & GYNECOLOGY

## 2018-11-12 RX ORDER — ONDANSETRON 2 MG/ML
4 INJECTION INTRAMUSCULAR; INTRAVENOUS ONCE AS NEEDED
Status: DISCONTINUED | OUTPATIENT
Start: 2018-11-12 | End: 2018-11-14 | Stop reason: HOSPADM

## 2018-11-12 RX ORDER — MISOPROSTOL 200 UG/1
600 TABLET ORAL ONCE AS NEEDED
Status: DISCONTINUED | OUTPATIENT
Start: 2018-11-12 | End: 2018-11-14 | Stop reason: HOSPADM

## 2018-11-12 RX ORDER — OXYCODONE HYDROCHLORIDE AND ACETAMINOPHEN 5; 325 MG/1; MG/1
2 TABLET ORAL EVERY 4 HOURS PRN
Status: DISCONTINUED | OUTPATIENT
Start: 2018-11-12 | End: 2018-11-14 | Stop reason: HOSPADM

## 2018-11-12 RX ORDER — SIMETHICONE 80 MG
80 TABLET,CHEWABLE ORAL 4 TIMES DAILY PRN
Status: DISCONTINUED | OUTPATIENT
Start: 2018-11-12 | End: 2018-11-14 | Stop reason: HOSPADM

## 2018-11-12 RX ORDER — PROMETHAZINE HYDROCHLORIDE 25 MG/1
12.5 SUPPOSITORY RECTAL EVERY 6 HOURS PRN
Status: DISCONTINUED | OUTPATIENT
Start: 2018-11-12 | End: 2018-11-14 | Stop reason: HOSPADM

## 2018-11-12 RX ORDER — OXYCODONE HYDROCHLORIDE AND ACETAMINOPHEN 5; 325 MG/1; MG/1
1 TABLET ORAL EVERY 4 HOURS PRN
Status: DISCONTINUED | OUTPATIENT
Start: 2018-11-12 | End: 2018-11-14 | Stop reason: HOSPADM

## 2018-11-12 RX ORDER — SODIUM CHLORIDE 0.9 % (FLUSH) 0.9 %
1-10 SYRINGE (ML) INJECTION AS NEEDED
Status: DISCONTINUED | OUTPATIENT
Start: 2018-11-12 | End: 2018-11-14 | Stop reason: HOSPADM

## 2018-11-12 RX ORDER — PROMETHAZINE HYDROCHLORIDE 25 MG/1
25 TABLET ORAL EVERY 6 HOURS PRN
Status: DISCONTINUED | OUTPATIENT
Start: 2018-11-12 | End: 2018-11-14 | Stop reason: HOSPADM

## 2018-11-12 RX ORDER — DIPHENHYDRAMINE HCL 25 MG
25 CAPSULE ORAL EVERY 4 HOURS PRN
Status: DISCONTINUED | OUTPATIENT
Start: 2018-11-12 | End: 2018-11-14 | Stop reason: HOSPADM

## 2018-11-12 RX ORDER — ONDANSETRON 4 MG/1
4 TABLET, FILM COATED ORAL EVERY 6 HOURS PRN
Status: DISCONTINUED | OUTPATIENT
Start: 2018-11-12 | End: 2018-11-14 | Stop reason: HOSPADM

## 2018-11-12 RX ORDER — PROMETHAZINE HYDROCHLORIDE 25 MG/ML
12.5 INJECTION, SOLUTION INTRAMUSCULAR; INTRAVENOUS EVERY 6 HOURS PRN
Status: DISCONTINUED | OUTPATIENT
Start: 2018-11-12 | End: 2018-11-14 | Stop reason: HOSPADM

## 2018-11-12 RX ORDER — ACETAMINOPHEN 325 MG/1
650 TABLET ORAL EVERY 4 HOURS PRN
Status: DISCONTINUED | OUTPATIENT
Start: 2018-11-12 | End: 2018-11-14 | Stop reason: HOSPADM

## 2018-11-12 RX ORDER — MORPHINE SULFATE 2 MG/ML
2 INJECTION, SOLUTION INTRAMUSCULAR; INTRAVENOUS
Status: ACTIVE | OUTPATIENT
Start: 2018-11-12 | End: 2018-11-13

## 2018-11-12 RX ORDER — IBUPROFEN 800 MG/1
800 TABLET ORAL EVERY 8 HOURS PRN
Status: DISCONTINUED | OUTPATIENT
Start: 2018-11-12 | End: 2018-11-14 | Stop reason: HOSPADM

## 2018-11-12 RX ORDER — DOCUSATE SODIUM 100 MG/1
100 CAPSULE, LIQUID FILLED ORAL 2 TIMES DAILY PRN
Status: DISCONTINUED | OUTPATIENT
Start: 2018-11-12 | End: 2018-11-14 | Stop reason: HOSPADM

## 2018-11-12 RX ORDER — SODIUM CHLORIDE 0.9 % (FLUSH) 0.9 %
3 SYRINGE (ML) INJECTION EVERY 12 HOURS SCHEDULED
Status: DISCONTINUED | OUTPATIENT
Start: 2018-11-12 | End: 2018-11-14 | Stop reason: HOSPADM

## 2018-11-12 RX ORDER — ONDANSETRON 4 MG/1
4 TABLET, ORALLY DISINTEGRATING ORAL EVERY 6 HOURS PRN
Status: DISCONTINUED | OUTPATIENT
Start: 2018-11-12 | End: 2018-11-14 | Stop reason: HOSPADM

## 2018-11-12 RX ORDER — ONDANSETRON 2 MG/ML
4 INJECTION INTRAMUSCULAR; INTRAVENOUS EVERY 6 HOURS PRN
Status: DISCONTINUED | OUTPATIENT
Start: 2018-11-12 | End: 2018-11-14 | Stop reason: HOSPADM

## 2018-11-12 RX ORDER — BISACODYL 10 MG
10 SUPPOSITORY, RECTAL RECTAL DAILY PRN
Status: DISCONTINUED | OUTPATIENT
Start: 2018-11-12 | End: 2018-11-14 | Stop reason: HOSPADM

## 2018-11-12 RX ORDER — NALOXONE HCL 0.4 MG/ML
0.4 VIAL (ML) INJECTION
Status: DISCONTINUED | OUTPATIENT
Start: 2018-11-12 | End: 2018-11-14 | Stop reason: HOSPADM

## 2018-11-12 RX ORDER — MORPHINE SULFATE 2 MG/ML
2 INJECTION, SOLUTION INTRAMUSCULAR; INTRAVENOUS EVERY 4 HOURS PRN
Status: DISCONTINUED | OUTPATIENT
Start: 2018-11-12 | End: 2018-11-14 | Stop reason: HOSPADM

## 2018-11-12 RX ORDER — HYDROXYZINE 50 MG/1
50 TABLET, FILM COATED ORAL EVERY 6 HOURS PRN
Status: DISCONTINUED | OUTPATIENT
Start: 2018-11-12 | End: 2018-11-14 | Stop reason: HOSPADM

## 2018-11-12 RX ADMIN — SIMETHICONE CHEW TAB 80 MG 80 MG: 80 TABLET ORAL at 08:34

## 2018-11-12 RX ADMIN — IBUPROFEN 800 MG: 800 TABLET ORAL at 08:33

## 2018-11-12 RX ADMIN — IBUPROFEN 800 MG: 800 TABLET ORAL at 16:16

## 2018-11-12 RX ADMIN — DOCUSATE SODIUM 100 MG: 100 CAPSULE, LIQUID FILLED ORAL at 22:06

## 2018-11-12 RX ADMIN — IBUPROFEN 800 MG: 800 TABLET ORAL at 00:36

## 2018-11-12 RX ADMIN — ACETAMINOPHEN 650 MG: 325 TABLET ORAL at 02:05

## 2018-11-12 RX ADMIN — ACETAMINOPHEN 650 MG: 325 TABLET ORAL at 13:03

## 2018-11-12 RX ADMIN — ACETAMINOPHEN 650 MG: 325 TABLET ORAL at 23:25

## 2018-11-12 RX ADMIN — DOCUSATE SODIUM 100 MG: 100 CAPSULE, LIQUID FILLED ORAL at 08:34

## 2018-11-12 NOTE — PROGRESS NOTES
After earlier repetetive fhr decels, resolved with amnio infusion. Pit restarted and up to 10 mu.    contn intensity increased.      Started having subtle variables and then around 7 pm, had a run of repetetive severe variables down to 60-90s for couple minutes and up to baseline for short time. fhr down for 9-10 minutes... Pit off, cvx 3+/80/-2. Oxygen.       Started to give terb but severe decels resolved.      Recommended she get epidural b/c if turn urgent and needs csection, would need general anesthesia.  Pt/ fob agreeable.       Now, s/p epidural and comfortable.    fht- beautifully reactive, great variability, no decels.     toco- with no pit, spaced to q 10 min with any strength.       Long d/w pt/fob -- reviewed the tracing back to earlier fht decel when pit turned off. Pt is physician so understands. D/w 3 significant decels remote from delivery and I will recommend csection. Has had 2 episodes so far. She is concerned that 3rd time may turn stat and fhr may not come back up since this last decels took about 10 min to recover. D/w I am willing to restart pit and see but it would also be reasonable to just proceed with csection. D/w my gut impression is that she will need csection b/c so remote from delivery.     Giving them time to decide if want to restart pit or proceed with csection.

## 2018-11-12 NOTE — ANESTHESIA POSTPROCEDURE EVALUATION
Patient: Radha Diamond    Procedure Summary     Date:  18 Room / Location:   DOMINIC LABOR DELIVERY   DOMINIC LABOR DELIVERY    Anesthesia Start:   Anesthesia Stop:      Procedure:   SECTION PRIMARY (N/A Abdomen) Diagnosis:      Surgeon:  Leslee Meyer MD Provider:  Danny Morris MD    Anesthesia Type:  epidural ASA Status:  2          Anesthesia Type: epidural  Last vitals  BP   157/81 (18)   Temp   36.7 °C (98.1 °F) (18)   Pulse   64 (18)   Resp   18 (18)     SpO2   97 % (18)     Post Anesthesia Care and Evaluation    Patient location during evaluation: bedside  Patient participation: complete - patient participated  Level of consciousness: awake and alert  Pain management: adequate  Airway patency: patent  Anesthetic complications: No anesthetic complications    Cardiovascular status: acceptable  Respiratory status: acceptable  Hydration status: acceptable    Comments: /81   Pulse 64   Temp 36.7 °C (98.1 °F) (Oral)   Resp 18   Wt 81.9 kg (180 lb 10.3 oz)   SpO2 97%   BMI 29.16 kg/m²

## 2018-11-12 NOTE — H&P
[facility]  Obstetric Preop History and Physical:    Patient Name: Radha Diamond  :  1985  MRN:  [id number]      Chief Complaint   Patient presents with   • Rupture of Membranes     rupture at 230pm, clar fluid, large amount, no odor, no vaginal bleeding.       Subjective                33 y.o. female  currently at 38w2d SROM not in labor. Pit augment. fhr decels with pitocin remote from delivery. 3cm.             Past OB History:       Obstetric History       T0      L0     SAB0   TAB0   Ectopic0   Molar0   Multiple0   Live Births0       # Outcome Date GA Lbr Vikas/2nd Weight Sex Delivery Anes PTL Lv   1 Current                   Past Medical History: Past Medical History:   Diagnosis Date   • Asthma    • Environmental allergies       Past Surgical History Past Surgical History:   Procedure Laterality Date   • ENDOSCOPY     • HYSTEROSALPINGOGRAM  2017   • SINUS SURGERY     • WISDOM TOOTH EXTRACTION        Family History: Family History   Problem Relation Age of Onset   • Hyperlipidemia Mother    • Hypertension Father    • Diabetes Father    • Heart attack Maternal Grandmother    • Diabetes Maternal Grandmother    • COPD Maternal Grandmother    • Stroke Maternal Grandmother    • Macular degeneration Maternal Grandmother    • Heart attack Maternal Grandfather    • Diabetes Maternal Grandfather    • Stroke Maternal Grandfather    • Alcohol abuse Maternal Grandfather    • Breast cancer Maternal Aunt       Social History:  reports that  has never smoked. she has never used smokeless tobacco.   reports that she drinks alcohol.   reports that she does not use drugs.    Allergies:     Bactrim [sulfamethoxazole-trimethoprim]; Doxycycline; and Tetracyclines & related     Objective       Vital Signs Range for the last 24 hours  Temperature: Temp:  [97.5 °F (36.4 °C)-98.3 °F (36.8 °C)] 98.1 °F (36.7 °C)   Temp Source: Temp src: Oral   BP: BP: (109-178)/() 136/110   Pulse: Heart Rate:   [50-74] 74   Respirations: Resp:  [16-18] 18   SPO2: SpO2:  [97 %-100 %] 100 %        dextrose 5 % and lactated Ringer's 125 mL/hr Last Rate: Stopped (18)   lactated ringers 125 mL/hr Last Rate: 125 mL/hr (18)   lactated ringers 125 mL/hr    oxytocin 2-30 joão-units/min Last Rate: 75 mL/hr (18)   oxytocin 250 mL/hr    Followed by     [START ON 2018] oxytocin 125 mL/hr    sodium chloride 100 mL/hr    sufentanil (0.5 mcg/mL) and ropivacaine (0.2%) 10 mL/hr                       Physical Exam:  General: Alert in NAD   Heart Normal rate and regular rhythm   Lungs Clear to auscultation bilaterally     Abdomen Gravid, soft, no masses             Cervix: Exam by: Method: sterile exam per physician   Dilation: Cervical Dilation (cm): 3   Effacement: Cervical Effacement: 80%   Station: Fetal Station: -2                             FHR:   TOCO: Reactive, good daisha, intermittent late decels with pit off.   q 5-10 min with pit off         Assessment/Plan     Assessment: fhr decels remote from delivery.     Plan: Recommend proceeding with  section for delivery. Indications for proceeding, risks and benefits have been discussed with patient and fob. All questions answered.         Leslee Meyer MD  2018  11:20 PM

## 2018-11-12 NOTE — PROGRESS NOTES
[facility]   PROGRESS NOTE    Patient Name: Radha Diamond  :  1985  MRN:  [id number]      Post-Op Day 1 S/P    Delivered a male infant.  Subjective     Patient reports:    Pain is well controlled. Voiding and ambulating without difficulty.    Tolerating po. Lochia normal.       The patient plans to breastfeed.         Objective       Vitals: Vital Signs Range for the last 24 hours  Temperature: Temp:  [96.7 °F (35.9 °C)-98.2 °F (36.8 °C)] 97.4 °F (36.3 °C)   Temp Source: Temp src: Oral   BP: BP: (122-178)/() 146/84   Pulse: Heart Rate:  [52-75] 66   Respirations: Resp:  [16-18] 18         Intake/Output Summary (Last 24 hours) at 2018 0952  Last data filed at 2018 0600  Gross per 24 hour   Intake 4295 ml   Output 3450 ml   Net 845 ml                                              Physical Exam     General Alert and awake, in NAD      CV Regular rate and rhythm      Lungs clear to auscultation bilaterally        Abdomen Soft, non-distended, fundus firm,  1 below umbilicus, appropriately tender      Incision  Dressing clean, dry and intact.      Extremities  0 edema, calves NT               LABS: Results from last 7 days   Lab Units  18   0444  11/10/18   2223   WBC 10*3/mm3  18.08*  10.07   HEMOGLOBIN g/dL  12.2  12.9   HEMATOCRIT %  38.3  39.2   PLATELETS 10*3/mm3  167  208         Prenatal labs results reviewed:  Yes   Rubella:  Immune  Rh Status:    RH type   Date Value Ref Range Status   11/10/2018 Positive  Final                       Assessment/Plan   : 1. POD 1 S/P C/S: Hemodynamically stable.  Doing well.  Continue routine care. Does not desire circ. BP labile, continue to monitor.       Pregnancy          RUTHIE Hercules  2018  9:52 AM

## 2018-11-12 NOTE — PLAN OF CARE
Problem:  Delivery (Adult,Obstetrics,Pediatric)  Goal: Signs and Symptoms of Listed Potential Problems Will be Absent, Minimized or Managed ( Delivery)  Outcome: Ongoing (interventions implemented as appropriate)   18 9784   Goal/Outcome Evaluation   Problems Assessed ( Delivery) all   Problems Present ( Delivery) none

## 2018-11-12 NOTE — L&D DELIVERY NOTE
[facility]   Section Operative Note    Patient Name: Radha Diamond  :  1985  MRN:  [id number]      Date of procedure:  2018        Pre-Operative Dx:   1.  IUP at 38w2d weeks   2. Fetal Intolerance of Labor remote from delivery        Postoperative Dx:  Same        Procedure: , Low Transverse primary     Surgeon: Leslee Meyer MD      Assistant: yamile     Anesthesia: Epidural    EBL: 800 mL     Specimens: Art and venous ph  placenta         Findings:                           Amniotic Fluid clera  Placenta Intact, 3 VC  Uterus normal  Tubes and ovaries normal bilaterally              Infant:            Gender: Viable male  infant     Weight: 3085 g (6 lb 12.8 oz)     Apgars: 8   @ 1 minute /     9   @ 5 minutes                 Indication for C/Section:     Fetal Intolerance of Labor      became emergent when FHT were in 50s when in OR         Procedure Details:  The patient was taken to the operating room where epidural anesthesia was found to be adequate. When in OR and checking FHT, was found to be 50s so became stat. Betadine prep. A Pfannenstiel incision was made and carried down to the fascia. The fascia was incised in the mid-line and extended laterally bluntly. The fascia was  from the underlying rectus muscles with blunt traction. The peritoneum was entered bluntly.  The bladder blade was inserted. The  lower uterine segment was incised in a transverse fashion and extended laterally with blunt cephalocaudal traction.   The head was elevated and delivered through the incision. The remainder of the infant was delivered without difficulty. The umbilical cord was clamped and cut after cord was milked and the infant was handed off the field. Cord ph and cord bloods were obtained. The placenta was removed intact and appeared normal. The uterine incision was inspected and found to be without extensions. Uterine incision was closed in 2 layers with O monocryl . Second  layer closure was  imbricating the first incorporating bladder flap peritoneum. The pelvic side walls were irrigated and cleared of all clot and debris. Adnexal anatomy was normal. The uterine incision was inspected and noted to be hemostatic. Rectus muscles were reapproximated  with 0 vicryl incorporating peritoneum. Subfacial area irrigated and made hemostatic.   The fascia was closed with 0 PDS in running continuous fashion. The subcutaneous tissue was irrigated and made hemostatic with bovie and closed with 3-0 vicryl. The skin was closed in subcuticular fashion with 4-0 Monocryl.   Instrument, sponge, and needle counts were correct prior the abdominal closure and at the conclusion of the case. Mother  to recovery room in stable condition.              Complications:     None          Antibiotics: cefazolin (Ancef) + azithromycin                      Leslee Meyer MD  11/11/2018  11:22 PM

## 2018-11-12 NOTE — ANESTHESIA PROCEDURE NOTES
Labor Epidural      Patient location during procedure: OB  Start Time: 11/11/2018 7:35 PM  Stop Time: 11/11/2018 7:50 PM  Indication:at surgeon's request  Performed By  Anesthesiologist: Brody Roberts MD  Preanesthetic Checklist  Completed: patient identified, site marked, surgical consent, pre-op evaluation, timeout performed, IV checked, risks and benefits discussed and monitors and equipment checked  Prep:  Pt Position:sitting  Sterile Tech:cap, gloves and mask  Prep:povidone-iodine 7.5% surgical scrub  Monitoring:blood pressure monitoring, continuous pulse oximetry and EKG  Epidural Block Procedure:  Approach:midline  Guidance:landmark technique  Location:L4-L5  Needle Type:Tuohy  Needle Gauge:17 and 17 G  Loss of Resistance Medium: air  Loss of Resistance: 7cm  Cath Depth at skin:10 cm  Paresthesia: none  Aspiration:negative  Test Dose:negative  Number of Attempts: 1  Post Assessment:  Dressing:occlusive dressing applied and secured with tape  Pt Tolerance:patient tolerated the procedure well with no apparent complications  Complications:no

## 2018-11-12 NOTE — ANESTHESIA PREPROCEDURE EVALUATION
Anesthesia Evaluation     Patient summary reviewed and Nursing notes reviewed   NPO Solid Status: N/A  NPO Liquid Status: N/A           Airway   Mallampati: II  TM distance: >3 FB  Neck ROM: full  no difficulty expected  Dental - normal exam     Pulmonary - normal exam   (+) asthma,   Cardiovascular - normal exam        Neuro/Psych  GI/Hepatic/Renal/Endo      Musculoskeletal     Abdominal  - normal exam   Substance History      OB/GYN    (+) Pregnant,         Other                        Anesthesia Plan    ASA 2     epidural     Anesthetic plan, all risks, benefits, and alternatives have been provided, discussed and informed consent has been obtained with: patient.

## 2018-11-12 NOTE — OP NOTE
[facility]   Section Operative Note    Patient Name: Radha Diamond  :  1985  MRN:  [id number]      Date of procedure:  2018        Pre-Operative Dx:   1.  IUP at 38w2d weeks   2. Fetal Intolerance of Labor remote from delivery        Postoperative Dx:  Same        Procedure: , Low Transverse primary     Surgeon: Leslee Meyer MD      Assistant: yamile     Anesthesia: Epidural    EBL: 800 mL     Specimens: Art and venous ph  placenta         Findings:                           Amniotic Fluid clera  Placenta Intact, 3 VC  Uterus normal  Tubes and ovaries normal bilaterally              Infant:            Gender: Viable male  infant     Weight: 3085 g (6 lb 12.8 oz)     Apgars: 8   @ 1 minute /     9   @ 5 minutes                 Indication for C/Section:     Fetal Intolerance of Labor      became emergent when FHT were in 50s when in OR         Procedure Details:  The patient was taken to the operating room where epidural anesthesia was found to be adequate. When in OR and checking FHT, was found to be 50s so became stat. Betadine prep. A Pfannenstiel incision was made and carried down to the fascia. The fascia was incised in the mid-line and extended laterally bluntly. The fascia was  from the underlying rectus muscles with blunt traction. The peritoneum was entered bluntly.  The bladder blade was inserted. The  lower uterine segment was incised in a transverse fashion and extended laterally with blunt cephalocaudal traction.   The head was elevated and delivered through the incision. The remainder of the infant was delivered without difficulty. The umbilical cord was clamped and cut after cord was milked and the infant was handed off the field. Cord ph and cord bloods were obtained. The placenta was removed intact and appeared normal. The uterine incision was inspected and found to be without extensions. Uterine incision was closed in 2 layers with O monocryl . Second  layer closure was  imbricating the first incorporating bladder flap peritoneum. The pelvic side walls were irrigated and cleared of all clot and debris. Adnexal anatomy was normal. The uterine incision was inspected and noted to be hemostatic. Rectus muscles were reapproximated  with 0 vicryl incorporating peritoneum. Subfacial area irrigated and made hemostatic.   The fascia was closed with 0 PDS in running continuous fashion. The subcutaneous tissue was irrigated and made hemostatic with bovie and closed with 3-0 vicryl. The skin was closed in subcuticular fashion with 4-0 Monocryl.   Instrument, sponge, and needle counts were correct prior the abdominal closure and at the conclusion of the case. Mother  to recovery room in stable condition.              Complications:     None          Antibiotics: cefazolin (Ancef) + azithromycin                      Leslee Meyer MD  11/11/2018  11:22 PM

## 2018-11-12 NOTE — PLAN OF CARE
Problem: Patient Care Overview  Goal: Plan of Care Review  Outcome: Ongoing (interventions implemented as appropriate)   11/12/18 0055   Coping/Psychosocial   Plan of Care Reviewed With patient;spouse   Plan of Care Review   Progress improving   OTHER   Outcome Summary c/s complete, bonding well with baby     Goal: Individualization and Mutuality  Outcome: Ongoing (interventions implemented as appropriate)   11/12/18 0055   Individualization   Patient Specific Preferences LIZETT, breastfeed   Patient Specific Goals (Include Timeframe) pain control, bonding time with baby   Patient Specific Interventions epidural, extra time in recovery room for breastfeeding and bonding   Mutuality/Individual Preferences   How to Address Anxieties/Fears discuss options and choices for plan of care   Mutuality/Individual Preferences   What Anxieties, Fears, Concerns, or Questions Do You Have About Your Care? something wrong with baby   What Information Would Help Us Give You More Personalized Care? 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   How Would You and/or Your Support Person Like to Participate in Your Care? discuss all plan of care and choices     Goal: Discharge Needs Assessment  Outcome: Ongoing (interventions implemented as appropriate)   11/12/18 0055   Discharge Needs Assessment   Readmission Within the Last 30 Days no previous admission in last 30 days   Concerns to be Addressed denies needs/concerns at this time   Patient/Family Anticipates Transition to home with family   Patient/Family Anticipated Services at Transition none   Transportation Concerns car, none   Transportation Anticipated family or friend will provide   Anticipated Changes Related to Illness none   Equipment Needed After Discharge none   Disability   Equipment Currently Used at Home none     Goal: Interprofessional Rounds/Family Conf  Outcome: Ongoing (interventions implemented as appropriate)   11/12/18 0055    Interdisciplinary Rounds/Family Conf   Participants family;nursing;patient;physician       Problem: Labor (Cervical Ripen, Induct, Augment) (Adult,Obstetrics,Pediatric)  Goal: Signs and Symptoms of Listed Potential Problems Will be Absent, Minimized or Managed (Labor)  Outcome: Outcome(s) achieved Date Met: 18   Goal/Outcome Evaluation   Problems Assessed (Labor) all   Problems Present (Labor) pain;change in fetal wellbeing       Problem: Fall Risk,  (Adult,Obstetrics,Pediatric)  Goal: Identify Related Risk Factors and Signs and Symptoms  Outcome: Ongoing (interventions implemented as appropriate)   18   Fall Risk,  (Adult,Obstetrics,Pediatric)   Related Risk Factors (Fall Risk, ) medication side effects;regional anesthesia   Signs and Symptoms (Fall Risk, ) presence of fall risk factors     Goal: Absence of Maternal Fall  Outcome: Ongoing (interventions implemented as appropriate)   18   Fall Risk,  (Adult,Obstetrics,Pediatric)   Absence of Maternal Fall making progress toward outcome     Goal: Absence of  Fall/Drop  Outcome: Ongoing (interventions implemented as appropriate)   18   Fall Risk,  (Adult,Obstetrics,Pediatric)   Absence of  Fall/Drop making progress toward outcome       Problem: Skin Injury Risk (Adult)  Goal: Identify Related Risk Factors and Signs and Symptoms  Outcome: Ongoing (interventions implemented as appropriate)   18   Skin Injury Risk (Adult)   Related Risk Factors (Skin Injury Risk) mobility impaired     Goal: Skin Health and Integrity  Outcome: Ongoing (interventions implemented as appropriate)   18   Skin Injury Risk (Adult)   Skin Health and Integrity making progress toward outcome       Problem: Anesthesia/Analgesia, Neuraxial (Obstetrics)  Goal: Signs and Symptoms of Listed Potential Problems Will be Absent, Minimized or Managed  (Anesthesia/Analgesia, Neuraxial)  Outcome: Outcome(s) achieved Date Met: 18   Goal/Outcome Evaluation   Problems Assessed (Neuraxial Anesthesia/Analgesia, OB) all   Problems Present (Neuraxial Anesth OB) none       Problem:  Delivery (Adult,Obstetrics,Pediatric)  Goal: Signs and Symptoms of Listed Potential Problems Will be Absent, Minimized or Managed ( Delivery)  Outcome: Ongoing (interventions implemented as appropriate)   18   Goal/Outcome Evaluation   Problems Assessed ( Delivery) all   Problems Present ( Delivery) none

## 2018-11-13 RX ADMIN — SIMETHICONE CHEW TAB 80 MG 80 MG: 80 TABLET ORAL at 08:06

## 2018-11-13 RX ADMIN — IBUPROFEN 800 MG: 800 TABLET ORAL at 13:02

## 2018-11-13 RX ADMIN — DOCUSATE SODIUM 100 MG: 100 CAPSULE, LIQUID FILLED ORAL at 20:58

## 2018-11-13 RX ADMIN — ACETAMINOPHEN 650 MG: 325 TABLET ORAL at 13:02

## 2018-11-13 RX ADMIN — IBUPROFEN 800 MG: 800 TABLET ORAL at 21:02

## 2018-11-13 RX ADMIN — ACETAMINOPHEN 650 MG: 325 TABLET ORAL at 18:06

## 2018-11-13 RX ADMIN — SIMETHICONE CHEW TAB 80 MG 80 MG: 80 TABLET ORAL at 20:58

## 2018-11-13 RX ADMIN — ACETAMINOPHEN 650 MG: 325 TABLET ORAL at 08:06

## 2018-11-13 RX ADMIN — SIMETHICONE CHEW TAB 80 MG 80 MG: 80 TABLET ORAL at 13:02

## 2018-11-13 RX ADMIN — IBUPROFEN 800 MG: 800 TABLET ORAL at 05:31

## 2018-11-13 NOTE — LACTATION NOTE
P1. 38 wkr.  Pt states infant has been doing great when interested and she pumps if he does not latch.  She has introduced supplements by choice.  There are a couple bruises above the nipple from infant latching to areola.  Worked on positioning for a deeper latch and pt impressed how she can feel him pull now.  Short frenulum noted and pt will address with peds if she feels necessary.  Pt to call LC as needed.    Lactation Consult Note    Evaluation Completed: 2018 5:56 PM  Patient Name: Radha Diamond  :  1985  MRN:  [id number]     REFERRAL  INFORMATION:                          Date of Referral: 18   Person Making Referral: patient  Maternal Reason for Referral: breastfeeding currently       DELIVERY HISTORY:          Skin to skin initiation date/time: 2018  11:20 PM   Skin to skin end date/time:              MATERNAL ASSESSMENT:  Breast Size Issue: none (18 : Ruthie Crawley, RN)  Breast Shape: Bilateral:, round (18 : Ruthie Crawley, RN)  Breast Density: Bilateral:, soft (18 : Ruthie Crawley, RN)  Areola: Bilateral:, elastic (18 : Ruthie Crawley, RN)  Nipples: Bilateral:, graspable (18 : Ruthie Crawley, RN)     Left Nipple Symptoms: bruised(above nipple) (18 : Ruthie Crawley, RN)  Right Nipple Symptoms: bruised(above nipple) (18 : Ruthie Crawley, RN)       INFANT ASSESSMENT:  Information for the patient's :  Elie Diamond [[id number]]   No past medical history on file.    Feeding Readiness Cues: eager (18 : Ruthie Crawley, RN)   Feeding Method: breastfeeding (18 : Ruthie Crawley, RN)   Feeding Tolerance/Success: adequate pause for breath, coordinated swallow, arousal required (18 : Ruthie Crawley, RN)       Satiety Cues: calm after feeding (18 : Ruthie Crawley, RN)           Feeding Interventions: latch assistance provided, lips stroked (18 4595 :  Ruthie Crawley, RN)   Nutrition Interventions: lactation consult initiated (18 : Ruthie Crawley, RN)   Additional Documentation: LATCH Score (Group) (18 : Ruthie Crawley RN)           Breastfeeding: breastfeeding, bilateral (18 : Ruthie Crawley, RN)   Infant Positioning: clutch/football, cross-cradle (18 : Ruthie Crawley, RN)       Breastfeeding Time, Right (min): 20 (18 : Ruthie Crawley, RN)   Effective Latch During Feeding: yes (18 : Ruthie Crawley, RN)   Suck/Swallow Coordination: present (18 : Ruthie Crawley RN)   Signs of Milk Transfer: infant jaw motion present, audible swallow (18 : Ruthie Crawley, RN)       Latch: 1-->repeated attempts, holds nipple in mouth, stimulate to suck (18 : Ruthie Crawley, RN)   Audible Swallowin-->spontaneous and intermittent (24 hrs old) (18 : Ruthie Crawley, RN)   Type of Nipple: 2-->everted (after stimulation) (18 : Ruthie Crawley, RN)   Comfort (Breast/Nipple): 1-->filling, red/small blisters/bruises, mild/mod discomfort (18 : Ruthie Crawley, RN)   Hold (Positioning): 1-->minimal assist, teach one side, mother does other, staff holds (18 : Ruthie Crawley, RN)   Latch Score: 7 (18 : Ruthie Crawley, RN)     Infant-Driven Feeding Scales - Readiness: Alert or fussy prior to care. Rooting and/or hands to mouth behavior. Good tone. (18 : Ruthie Crawley, RN)   Infant-Driven Feeding Scales - Quality: Nipples with a strong coordinated SSB but fatigues with progression. (18 : Ruthie Crawley, RN)             MATERNAL INFANT FEEDING:  Maternal Preparation: breast care (18 : Ruthie Crawley, RN)  Maternal Emotional State: relaxed (18 : Ruthie Crawley, RN)  Infant Positioning: clutch/football, cross-cradle (18 : Ruthie Crawley, RN)   Signs of Milk Transfer: audible swallow, infant  jaw motion present (11/13/18 1748 : Ruthie Crawley, RN)  Pain with Feeding: no (11/13/18 1748 : Ruthie Crawley, RN)           Milk Ejection Reflex: present (11/13/18 1748 : Ruthie Crawley, RN)  Comfort Measures Following Feeding: air-drying encouraged (11/13/18 1748 : Ruthie Crawley, RN)        Latch Assistance: yes (11/13/18 1748 : Ruthie Crawley, RN)                               EQUIPMENT TYPE:  Breast Pump Type: double electric, hospital grade (11/13/18 1748 : Ruthie Crawley, RN)  Breast Pump Flange Type: hard (11/13/18 1748 : Ruthie Crawley, RN)  Breast Pump Flange Size: 24 mm (11/13/18 1748 : Ruthie Crawley, RN)    Breast Care: Breastfeeding: breast milk to nipples, milk massaged towards nipple, open to air (11/13/18 1748 : Ruthie Crawley, RN)  Breastfeeding Assistance: assisted with positioning, feeding cue recognition promoted, feeding session observed, feeding on demand promoted, infant latch-on verified, support offered, infant stimulated to wakeful state (11/13/18 1748 : Ruthie Crawley, RN)     Breastfeeding Support: encouragement provided, lactation counseling provided, maternal nutrition promoted, maternal rest encouraged, maternal hydration promoted, infant-mother separation minimized, diary/feeding log utilized (11/13/18 1748 : Ruthie Crawley, RN)          BREAST PUMPING:          LACTATION REFERRALS:

## 2018-11-13 NOTE — LACTATION NOTE
Pt states nursing going well today.  She did have infant supplemented just because she felt he needed a little something and could not calm him to get a latch.  Nipples tender, pink, but intact.  Reviewed correct flange size.  Pt to call  for feeding observation later today.

## 2018-11-13 NOTE — PLAN OF CARE
Problem:  Delivery (Adult,Obstetrics,Pediatric)  Goal: Signs and Symptoms of Listed Potential Problems Will be Absent, Minimized or Managed ( Delivery)  Outcome: Ongoing (interventions implemented as appropriate)   18 8250   Goal/Outcome Evaluation   Problems Assessed ( Delivery) all   Problems Present ( Delivery) none

## 2018-11-13 NOTE — PROGRESS NOTES
[facility]   PROGRESS NOTE    Patient Name: Radha Dimaond  :  1985  MRN:  [id number]      Post-Op Day 2 S/P    Delivered a male infant.  Subjective     Patient reports:    Pain is well controlled. Ambulating without difficulty.  Tolerating po. Lochia normal.       The patient plans to breastfeed.  Was unable to void spontaneously yesterday after straight cath x 1 so mccarthy replaced around 11pm.  Making adequate urine, clear.         Objective       Vitals: Vital Signs Range for the last 24 hours  Temperature: Temp:  [97.8 °F (36.6 °C)-98.1 °F (36.7 °C)] 98.1 °F (36.7 °C)   Temp Source: Temp src: Oral   BP: BP: (135-159)/(78-90) 159/90   Pulse: Heart Rate:  [60-78] 64   Respirations: Resp:  [16-20] 16         Intake/Output Summary (Last 24 hours) at 2018 1253  Last data filed at 2018 0530  Gross per 24 hour   Intake --   Output 3050 ml   Net -3050 ml                                              Physical Exam     General Alert and awake, in NAD      CV Regular rate and rhythm      Lungs clear to auscultation bilaterally        Abdomen Soft, non-distended, fundus firm,  U-2 below umbilicus, appropriately tender      Incision  Dressing clean, dry and intact.      Extremities  min edema, calves NT               LABS: Results from last 7 days   Lab Units  18   0444  11/10/18   2223   WBC 10*3/mm3  18.08*  10.07   HEMOGLOBIN g/dL  12.2  12.9   HEMATOCRIT %  38.3  39.2   PLATELETS 10*3/mm3  167  208         Prenatal labs results reviewed:  Yes   Rubella:  immune  Rh Status:    RH type   Date Value Ref Range Status   11/10/2018 Positive  Final                       Assessment/Plan   : 1. POD 2 S/P C/S: Hemodynamically stable.  Doing well.  Continue routine care. Will remove mccarthy later this afternoon and attempt void again.      Pregnancy          Karly Israel MD  2018  12:53 PM

## 2018-11-14 VITALS
DIASTOLIC BLOOD PRESSURE: 88 MMHG | TEMPERATURE: 97.8 F | RESPIRATION RATE: 16 BRPM | BODY MASS INDEX: 29.16 KG/M2 | WEIGHT: 180.65 LBS | OXYGEN SATURATION: 99 % | SYSTOLIC BLOOD PRESSURE: 145 MMHG | HEART RATE: 58 BPM

## 2018-11-14 RX ORDER — IBUPROFEN 800 MG/1
800 TABLET ORAL EVERY 8 HOURS PRN
Qty: 30 TABLET | Refills: 2 | Status: SHIPPED | OUTPATIENT
Start: 2018-11-14

## 2018-11-14 RX ORDER — OXYCODONE HYDROCHLORIDE AND ACETAMINOPHEN 5; 325 MG/1; MG/1
2 TABLET ORAL EVERY 4 HOURS PRN
Qty: 24 TABLET | Refills: 0 | Status: SHIPPED | OUTPATIENT
Start: 2018-11-14 | End: 2018-11-17

## 2018-11-14 RX ADMIN — DOCUSATE SODIUM 100 MG: 100 CAPSULE, LIQUID FILLED ORAL at 07:57

## 2018-11-14 RX ADMIN — IBUPROFEN 800 MG: 800 TABLET ORAL at 06:54

## 2018-11-14 RX ADMIN — SIMETHICONE CHEW TAB 80 MG 80 MG: 80 TABLET ORAL at 07:57

## 2018-11-14 RX ADMIN — ACETAMINOPHEN 650 MG: 325 TABLET ORAL at 03:31

## 2018-11-14 RX ADMIN — ACETAMINOPHEN 650 MG: 325 TABLET ORAL at 07:56

## 2018-11-14 NOTE — LACTATION NOTE
This note was copied from a baby's chart.  Breastfeeding going well per Mom. D/c today. [facility] but may cancel since she feels like everything is going well. Discussed engorgement management, feeding patterns and baby's output.

## 2018-11-14 NOTE — DISCHARGE SUMMARY
Date of Discharge:  2018    Discharge Diagnosis:     Presenting Problem/History of Present Illness  Pregnancy [Z34.90]       Hospital Course  Patient is a 33 y.o. female presented with labor. Fetal intolerance to labor, C/S performed. Delivered viable male infant. Hospital course has been uncomplicated, BP's 140's/80's but pt asymptomatic. Pt is stable and ready for d/c.       Procedures Performed  Procedure(s):   SECTION PRIMARY         Condition on Discharge:  Post-Op Day 3 S/P   Subjective     Patient reports:    Doing well - ready for discharge. Pain well controlled. Tolerating po and having flatus. Voiding and ambulating without difficulty. Lochia normal.     Vital Signs  Temp:  [97.8 °F (36.6 °C)-98.3 °F (36.8 °C)] 97.8 °F (36.6 °C)  Heart Rate:  [58-62] 58  Resp:  [16] 16  BP: (142-158)/(87-92) 145/88    Physical Exam    Gen Alert and awake   Abdomen Soft, ND,  Fundus firm with minimal tenderness   Incision  Intact, without erythema or exudate   Extremities Calves NT bilaterally     Assessment/Plan ]   Assessment:  POD 3  -  Doing well. D/w BP's at length, she is asymptomatic and would like to avoid medication. She would really like to go home today, has BP cuff at home and will monitor. To call for readings over 140/90, or s/s preeclampsia. Will see pt in office in 1-2 weeks for BP and incision check. Stable for discharge.     Plan:    Instructions reviewed       Consults:   Consults     Date and Time Order Name Status Description    2018 2538 Inpatient Consult to Anesthesiology            Discharge Disposition      Discharge Medications     Discharge Medications      New Medications      Instructions Start Date   ibuprofen 800 MG tablet  Commonly known as:  ADVIL,MOTRIN   800 mg, Oral, Every 8 Hours PRN      oxyCODONE-acetaminophen 5-325 MG per tablet  Commonly known as:  PERCOCET   2 tablets, Oral, Every 4 Hours PRN         Stop These Medications    albuterol 108 (90  Base) MCG/ACT inhaler  Commonly known as:  PROVENTIL HFA;VENTOLIN HFA     ALLERGY SERUM INJECTION     EPINEPHrine 0.3 MG/0.3ML solution auto-injector injection  Commonly known as:  EPIPEN     FEMARA PO     fluticasone 50 MCG/ACT nasal spray  Commonly known as:  FLONASE     oseltamivir 75 MG capsule  Commonly known as:  TAMIFLU     Progesterone 200 MG suppository            The patient has been prescribed a controlled substance.  She has been counseled on the risks associated with using the medication.   The addictive potential of this medication and alternatives were discussed carefully with this patient and she demonstrated understanding.  A NANCY report has been obtained and reviewed.    Activity at Discharge:     Follow-up Appointments  Future Appointments   Date Time Provider Department [place]   11/19/2018  1:00 PM [facility] ROOM [facility]   1/28/2019  8:00 AM [facility] None   2/4/2019  1:00 PM Tsering Phillips MD [facility] None     Additional Instructions for the Follow-ups that You Need to Schedule     Call MD With Problems / Concerns   As directed      Instructions: call for fever, increased vaginal bleeding or pain, any other concerns    Order Comments:  Instructions: call for fever, increased vaginal bleeding or pain, any other concerns                Test Results Pending at Discharge       RUTHIE Bee  11/14/18  9:12 AM

## 2019-08-20 DIAGNOSIS — R11.2 NAUSEA AND VOMITING, INTRACTABILITY OF VOMITING NOT SPECIFIED, UNSPECIFIED VOMITING TYPE: ICD-10-CM

## 2019-08-20 DIAGNOSIS — R10.84 GENERALIZED ABDOMINAL PAIN: Primary | ICD-10-CM

## 2019-08-22 ENCOUNTER — HOSPITAL ENCOUNTER (EMERGENCY)
Facility: HOSPITAL | Age: 34
Discharge: HOME | End: 2019-08-23
Attending: EMERGENCY MEDICINE | Admitting: EMERGENCY MEDICINE

## 2019-08-22 ENCOUNTER — APPOINTMENT (OUTPATIENT)
Dept: CT IMAGING | Facility: HOSPITAL | Age: 34
End: 2019-08-22

## 2019-08-22 DIAGNOSIS — R11.2 NAUSEA AND VOMITING, INTRACTABILITY OF VOMITING NOT SPECIFIED, UNSPECIFIED VOMITING TYPE: Primary | ICD-10-CM

## 2019-08-22 DIAGNOSIS — R10.9 NONSPECIFIC ABDOMINAL PAIN: Primary | ICD-10-CM

## 2019-08-22 LAB
ALBUMIN SERPL-MCNC: 5 G/DL (ref 3.5–5.2)
ALBUMIN/GLOB SERPL: 2 G/DL
ALP SERPL-CCNC: 74 U/L (ref 39–117)
ALT SERPL W P-5'-P-CCNC: 18 U/L (ref 1–33)
ANION GAP SERPL CALCULATED.3IONS-SCNC: 9.7 MMOL/L (ref 5–15)
AST SERPL-CCNC: 24 U/L (ref 1–32)
B-HCG UR QL: NEGATIVE
BASOPHILS # BLD AUTO: 0.04 10*3/MM3 (ref 0–0.2)
BASOPHILS NFR BLD AUTO: 0.5 % (ref 0–1.5)
BILIRUB SERPL-MCNC: 0.8 MG/DL (ref 0.2–1.2)
BILIRUB UR QL STRIP: NEGATIVE
BUN BLD-MCNC: 11 MG/DL (ref 6–20)
BUN/CREAT SERPL: 16.2 (ref 7–25)
CALCIUM SPEC-SCNC: 9.1 MG/DL (ref 8.6–10.5)
CHLORIDE SERPL-SCNC: 104 MMOL/L (ref 98–107)
CLARITY UR: CLEAR
CO2 SERPL-SCNC: 31.3 MMOL/L (ref 22–29)
COLOR UR: YELLOW
CREAT BLD-MCNC: 0.68 MG/DL (ref 0.57–1)
DEPRECATED RDW RBC AUTO: 40.6 FL (ref 37–54)
EOSINOPHIL # BLD AUTO: 0.27 10*3/MM3 (ref 0–0.4)
EOSINOPHIL NFR BLD AUTO: 3.5 % (ref 0.3–6.2)
ERYTHROCYTE [DISTWIDTH] IN BLOOD BY AUTOMATED COUNT: 12.4 % (ref 12.3–15.4)
GFR SERPL CREATININE-BSD FRML MDRD: 100 ML/MIN/1.73
GFR SERPL CREATININE-BSD FRML MDRD: 121 ML/MIN/1.73
GLOBULIN UR ELPH-MCNC: 2.5 GM/DL
GLUCOSE BLD-MCNC: 86 MG/DL (ref 65–99)
GLUCOSE UR STRIP-MCNC: NEGATIVE MG/DL
HCT VFR BLD AUTO: 46.3 % (ref 34–46.6)
HGB BLD-MCNC: 14.8 G/DL (ref 12–15.9)
HGB UR QL STRIP.AUTO: NEGATIVE
IMM GRANULOCYTES # BLD AUTO: 0.02 10*3/MM3 (ref 0–0.05)
IMM GRANULOCYTES NFR BLD AUTO: 0.3 % (ref 0–0.5)
KETONES UR QL STRIP: NEGATIVE
LEUKOCYTE ESTERASE UR QL STRIP.AUTO: NEGATIVE
LYMPHOCYTES # BLD AUTO: 3.35 10*3/MM3 (ref 0.7–3.1)
LYMPHOCYTES NFR BLD AUTO: 43.8 % (ref 19.6–45.3)
MCH RBC QN AUTO: 28.7 PG (ref 26.6–33)
MCHC RBC AUTO-ENTMCNC: 32 G/DL (ref 31.5–35.7)
MCV RBC AUTO: 89.9 FL (ref 79–97)
MONOCYTES # BLD AUTO: 0.65 10*3/MM3 (ref 0.1–0.9)
MONOCYTES NFR BLD AUTO: 8.5 % (ref 5–12)
NEUTROPHILS # BLD AUTO: 3.31 10*3/MM3 (ref 1.7–7)
NEUTROPHILS NFR BLD AUTO: 43.4 % (ref 42.7–76)
NITRITE UR QL STRIP: NEGATIVE
NRBC BLD AUTO-RTO: 0 /100 WBC (ref 0–0.2)
PH UR STRIP.AUTO: 7 [PH] (ref 5–8)
PLATELET # BLD AUTO: 283 10*3/MM3 (ref 140–450)
PMV BLD AUTO: 10.2 FL (ref 6–12)
POTASSIUM BLD-SCNC: 3.6 MMOL/L (ref 3.5–5.2)
PROT SERPL-MCNC: 7.5 G/DL (ref 6–8.5)
PROT UR QL STRIP: NEGATIVE
RBC # BLD AUTO: 5.15 10*6/MM3 (ref 3.77–5.28)
SODIUM BLD-SCNC: 145 MMOL/L (ref 136–145)
SP GR UR STRIP: <1.005 (ref 1–1.03)
UROBILINOGEN UR QL STRIP: ABNORMAL
WBC NRBC COR # BLD: 7.64 10*3/MM3 (ref 3.4–10.8)

## 2019-08-22 PROCEDURE — 25010000002 IOPAMIDOL 61 % SOLUTION: Performed by: EMERGENCY MEDICINE

## 2019-08-22 PROCEDURE — 80053 COMPREHEN METABOLIC PANEL: CPT | Performed by: EMERGENCY MEDICINE

## 2019-08-22 PROCEDURE — 25010000002 ONDANSETRON PER 1 MG: Performed by: EMERGENCY MEDICINE

## 2019-08-22 PROCEDURE — 25010000002 KETOROLAC TROMETHAMINE PER 15 MG: Performed by: EMERGENCY MEDICINE

## 2019-08-22 PROCEDURE — 96361 HYDRATE IV INFUSION ADD-ON: CPT

## 2019-08-22 PROCEDURE — 85025 COMPLETE CBC W/AUTO DIFF WBC: CPT | Performed by: EMERGENCY MEDICINE

## 2019-08-22 PROCEDURE — 96375 TX/PRO/DX INJ NEW DRUG ADDON: CPT

## 2019-08-22 PROCEDURE — 81025 URINE PREGNANCY TEST: CPT | Performed by: EMERGENCY MEDICINE

## 2019-08-22 PROCEDURE — 81003 URINALYSIS AUTO W/O SCOPE: CPT | Performed by: EMERGENCY MEDICINE

## 2019-08-22 PROCEDURE — 96376 TX/PRO/DX INJ SAME DRUG ADON: CPT

## 2019-08-22 PROCEDURE — 96374 THER/PROPH/DIAG INJ IV PUSH: CPT

## 2019-08-22 PROCEDURE — 74177 CT ABD & PELVIS W/CONTRAST: CPT

## 2019-08-22 PROCEDURE — 99284 EMERGENCY DEPT VISIT MOD MDM: CPT

## 2019-08-22 RX ORDER — ONDANSETRON 2 MG/ML
4 INJECTION INTRAMUSCULAR; INTRAVENOUS ONCE
Status: COMPLETED | OUTPATIENT
Start: 2019-08-22 | End: 2019-08-22

## 2019-08-22 RX ORDER — SODIUM CHLORIDE 9 MG/ML
125 INJECTION, SOLUTION INTRAVENOUS CONTINUOUS
Status: DISCONTINUED | OUTPATIENT
Start: 2019-08-22 | End: 2019-08-23 | Stop reason: HOSPADM

## 2019-08-22 RX ORDER — SODIUM CHLORIDE 0.9 % (FLUSH) 0.9 %
10 SYRINGE (ML) INJECTION AS NEEDED
Status: DISCONTINUED | OUTPATIENT
Start: 2019-08-22 | End: 2019-08-23 | Stop reason: HOSPADM

## 2019-08-22 RX ORDER — KETOROLAC TROMETHAMINE 30 MG/ML
30 INJECTION, SOLUTION INTRAMUSCULAR; INTRAVENOUS ONCE
Status: COMPLETED | OUTPATIENT
Start: 2019-08-22 | End: 2019-08-22

## 2019-08-22 RX ADMIN — KETOROLAC TROMETHAMINE 30 MG: 30 INJECTION, SOLUTION INTRAMUSCULAR at 21:25

## 2019-08-22 RX ADMIN — SODIUM CHLORIDE 1000 ML: 9 INJECTION, SOLUTION INTRAVENOUS at 21:16

## 2019-08-22 RX ADMIN — ONDANSETRON 4 MG: 2 INJECTION INTRAMUSCULAR; INTRAVENOUS at 23:48

## 2019-08-22 RX ADMIN — SODIUM CHLORIDE 125 ML/HR: 9 INJECTION, SOLUTION INTRAVENOUS at 22:51

## 2019-08-22 RX ADMIN — IOPAMIDOL 85 ML: 612 INJECTION, SOLUTION INTRAVENOUS at 23:00

## 2019-08-22 RX ADMIN — ONDANSETRON 4 MG: 2 INJECTION INTRAMUSCULAR; INTRAVENOUS at 21:22

## 2019-08-23 VITALS
TEMPERATURE: 97.1 F | HEART RATE: 61 BPM | RESPIRATION RATE: 16 BRPM | SYSTOLIC BLOOD PRESSURE: 107 MMHG | BODY MASS INDEX: 21.19 KG/M2 | OXYGEN SATURATION: 97 % | WEIGHT: 135 LBS | HEIGHT: 67 IN | DIASTOLIC BLOOD PRESSURE: 61 MMHG

## 2019-08-23 LAB
ALBUMIN SERPL-MCNC: 5.1 G/DL (ref 3.5–5.2)
ALBUMIN/GLOB SERPL: 2.3 G/DL
ALP SERPL-CCNC: 76 U/L (ref 39–117)
ALT SERPL-CCNC: 20 U/L (ref 1–33)
AST SERPL-CCNC: 23 U/L (ref 1–32)
BASOPHILS # BLD AUTO: 0.04 10*3/MM3 (ref 0–0.2)
BASOPHILS NFR BLD AUTO: 0.5 % (ref 0–1.5)
BILIRUB SERPL-MCNC: 0.7 MG/DL (ref 0.2–1.2)
BUN SERPL-MCNC: 12 MG/DL (ref 6–20)
BUN/CREAT SERPL: 16.4 (ref 7–25)
CALCIUM SERPL-MCNC: 9.3 MG/DL (ref 8.6–10.5)
CHLORIDE SERPL-SCNC: 100 MMOL/L (ref 98–107)
CO2 SERPL-SCNC: 29.6 MMOL/L (ref 22–29)
CREAT SERPL-MCNC: 0.73 MG/DL (ref 0.57–1)
CRP SERPL-MCNC: 0.3 MG/DL (ref 0–0.5)
EOSINOPHIL # BLD AUTO: 0.2 10*3/MM3 (ref 0–0.4)
EOSINOPHIL NFR BLD AUTO: 2.4 % (ref 0.3–6.2)
ERYTHROCYTE [DISTWIDTH] IN BLOOD BY AUTOMATED COUNT: 12.6 % (ref 12.3–15.4)
GLOBULIN SER CALC-MCNC: 2.2 GM/DL
GLUCOSE SERPL-MCNC: 87 MG/DL (ref 65–99)
HCT VFR BLD AUTO: 45.7 % (ref 34–46.6)
HGB BLD-MCNC: 14.8 G/DL (ref 12–15.9)
IMM GRANULOCYTES # BLD AUTO: 0.03 10*3/MM3 (ref 0–0.05)
IMM GRANULOCYTES NFR BLD AUTO: 0.4 % (ref 0–0.5)
LIPASE SERPL-CCNC: 32 U/L (ref 13–60)
LYMPHOCYTES # BLD AUTO: 2.61 10*3/MM3 (ref 0.7–3.1)
LYMPHOCYTES NFR BLD AUTO: 31.9 % (ref 19.6–45.3)
MCH RBC QN AUTO: 29.2 PG (ref 26.6–33)
MCHC RBC AUTO-ENTMCNC: 32.4 G/DL (ref 31.5–35.7)
MCV RBC AUTO: 90.1 FL (ref 79–97)
MONOCYTES # BLD AUTO: 0.6 10*3/MM3 (ref 0.1–0.9)
MONOCYTES NFR BLD AUTO: 7.3 % (ref 5–12)
NEUTROPHILS # BLD AUTO: 4.69 10*3/MM3 (ref 1.7–7)
NEUTROPHILS NFR BLD AUTO: 57.5 % (ref 42.7–76)
NRBC BLD AUTO-RTO: 0 /100 WBC (ref 0–0.2)
PLATELET # BLD AUTO: 286 10*3/MM3 (ref 140–450)
POTASSIUM SERPL-SCNC: 4.4 MMOL/L (ref 3.5–5.2)
PROT SERPL-MCNC: 7.3 G/DL (ref 6–8.5)
RBC # BLD AUTO: 5.07 10*6/MM3 (ref 3.77–5.28)
SODIUM SERPL-SCNC: 143 MMOL/L (ref 136–145)
WBC # BLD AUTO: 8.17 10*3/MM3 (ref 3.4–10.8)

## 2019-08-26 ENCOUNTER — TELEPHONE (OUTPATIENT)
Dept: OBSTETRICS AND GYNECOLOGY | Facility: CLINIC | Age: 34
End: 2019-08-26

## 2019-08-26 NOTE — TELEPHONE ENCOUNTER
I called the patient and LMOM that we have her device in and she can call me to schedule. Left my number and ext

## 2019-09-18 ENCOUNTER — OFFICE VISIT (OUTPATIENT)
Dept: OBSTETRICS AND GYNECOLOGY | Facility: CLINIC | Age: 34
End: 2019-09-18

## 2019-09-18 VITALS
SYSTOLIC BLOOD PRESSURE: 122 MMHG | WEIGHT: 132 LBS | DIASTOLIC BLOOD PRESSURE: 70 MMHG | BODY MASS INDEX: 20.72 KG/M2 | HEIGHT: 67 IN

## 2019-09-18 DIAGNOSIS — Z13.9 SCREENING FOR CONDITION: Primary | ICD-10-CM

## 2019-09-18 DIAGNOSIS — Z76.89 ENCOUNTER TO ESTABLISH CARE WITH NEW DOCTOR: ICD-10-CM

## 2019-09-18 DIAGNOSIS — Z53.8 UNSUCCESSFUL IUD INSERTION: ICD-10-CM

## 2019-09-18 DIAGNOSIS — N88.2 CERVICAL STENOSIS (UTERINE CERVIX): ICD-10-CM

## 2019-09-18 LAB
B-HCG UR QL: NEGATIVE
INTERNAL NEGATIVE CONTROL: NEGATIVE
INTERNAL POSITIVE CONTROL: POSITIVE
Lab: 2154

## 2019-09-18 PROCEDURE — 99203 OFFICE O/P NEW LOW 30 MIN: CPT | Performed by: OBSTETRICS & GYNECOLOGY

## 2019-09-18 PROCEDURE — 81025 URINE PREGNANCY TEST: CPT | Performed by: OBSTETRICS & GYNECOLOGY

## 2019-09-18 PROCEDURE — 58300 INSERT INTRAUTERINE DEVICE: CPT | Performed by: OBSTETRICS & GYNECOLOGY

## 2019-09-18 RX ORDER — MISOPROSTOL 200 UG/1
TABLET ORAL
Qty: 4 TABLET | Refills: 0 | Status: SHIPPED | OUTPATIENT
Start: 2019-09-18 | End: 2019-09-25

## 2019-09-18 NOTE — PROGRESS NOTES
"New GYN Exam    CC- Here to establish care and to have a Kyleena IUD placed.     Radha Diamond is a 33 y.o. female new patient who presents for a Kyleena IUD placement and to establish care. She just gave birth in 2018 by C/S and is still breastfeeding. She has not had a cycle yet. She had infertility and had a blocked R tube on HSG and a luteal phase defect. She did conceive spontaneously.       OB History      Para Term  AB Living    1 1 1     1    SAB TAB Ectopic Molar Multiple Live Births            0 1        Obstetric Comments     1 C/S for FTP/fetal distress          Menarche: 10  Current contraception: none  History of abnormal Pap smear: no  History of abnormal mammogram: no  Family history of uterine, colon or ovarian cancer: no  Family history of breast cancer: yes - m aunt > 50  H/o STDs: none  Last pap: 1-2 years ago  Gardasil:completed  MAKENZIE: none   Luteal phase defect  R fallopian tube blocked on HSG  R ovary hard to see on US, op note from C/S notes \"adnexa normal\"    Health Maintenance   Topic Date Due   • PAP SMEAR  2018   • ANNUAL PHYSICAL  2018   • INFLUENZA VACCINE  2019   • TDAP/TD VACCINES (3 - Td) 2028       Past Medical History:   Diagnosis Date   • Asthma    • Environmental allergies    • Female infertility     R fallopian tube blocked   • Infertility due to luteal phase defect        Past Surgical History:   Procedure Laterality Date   •  SECTION N/A 2018    Procedure:  SECTION PRIMARY;  Surgeon: Leslee Meyer MD;  Location: [facility];  Service: Obstetrics/Gynecology   • ENDOSCOPY     • HYSTEROSALPINGOGRAM  2017   • SINUS SURGERY     • WISDOM TOOTH EXTRACTION           Current Outpatient Medications:   •  ibuprofen (ADVIL,MOTRIN) 800 MG tablet, Take 1 tablet by mouth Every 8 (Eight) Hours As Needed for Mild Pain ., Disp: 30 tablet, Rfl: 2  •  misoprostol (CYTOTEC) 200 MCG tablet, 4 pills by mouth the evening " before procedure, Disp: 4 tablet, Rfl: 0    Allergies   Allergen Reactions   • Bactrim [Sulfamethoxazole-Trimethoprim] Anaphylaxis   • Doxycycline Anaphylaxis   • Tetracyclines & Related Anaphylaxis       Social History     Tobacco Use   • Smoking status: Never Smoker   • Smokeless tobacco: Never Used   Substance Use Topics   • Alcohol use: Yes     Comment: 5-7 glasses of wine weekly   • Drug use: No       Family History   Problem Relation Age of Onset   • Hyperlipidemia Mother    • Hypertension Father    • Diabetes Father    • Heart attack Maternal Grandmother    • Diabetes Maternal Grandmother    • COPD Maternal Grandmother    • Stroke Maternal Grandmother    • Macular degeneration Maternal Grandmother    • Heart attack Maternal Grandfather    • Diabetes Maternal Grandfather    • Stroke Maternal Grandfather    • Alcohol abuse Maternal Grandfather    • Breast cancer Maternal Aunt    • Down syndrome Other    • Ovarian cancer Neg Hx    • Colon cancer Neg Hx    • Pulmonary embolism Neg Hx    • Deep vein thrombosis Neg Hx        Review of Systems   Constitutional: Negative for appetite change, fatigue, fever and unexpected weight change.   Eyes: Negative for photophobia and visual disturbance.   Respiratory: Negative for cough and shortness of breath.    Cardiovascular: Negative for chest pain and palpitations.   Gastrointestinal: Negative for abdominal distention, abdominal pain, constipation, diarrhea and nausea.   Endocrine: Negative for cold intolerance and heat intolerance.   Genitourinary: Negative for dyspareunia, dysuria, menstrual problem, pelvic pain and vaginal discharge.   Musculoskeletal: Negative for back pain.   Skin: Negative for color change and rash.   Allergic/Immunologic: Positive for environmental allergies.   Neurological: Negative for headaches.   Hematological: Negative for adenopathy. Does not bruise/bleed easily.   Psychiatric/Behavioral: Positive for sleep disturbance (childcare). Negative  "for dysphoric mood. The patient is not nervous/anxious.        /70   Ht 170.2 cm (67\")   Wt 59.9 kg (132 lb)   Breastfeeding? Yes   BMI 20.67 kg/m²     Physical Exam   Constitutional: She is oriented to person, place, and time. She appears well-developed and well-nourished.   HENT:   Head: Normocephalic and atraumatic.   Eyes: Conjunctivae are normal. No scleral icterus.   Neck: Neck supple. No thyromegaly present.   Cardiovascular: Normal rate and regular rhythm.   Pulmonary/Chest: Effort normal and breath sounds normal.   Abdominal: Soft. Bowel sounds are normal. She exhibits no distension and no mass. There is no tenderness. There is no rebound and no guarding. No hernia.   Genitourinary: Uterus normal. Pelvic exam was performed with patient supine. There is no rash, tenderness, lesion or injury on the right labia. There is no rash, tenderness, lesion or injury on the left labia. Cervix exhibits no motion tenderness, no discharge and no friability. Right adnexum displays no mass, no tenderness and no fullness. Left adnexum displays no mass, no tenderness and no fullness. No erythema, tenderness or bleeding in the vagina. No foreign body in the vagina. No signs of injury around the vagina. No vaginal discharge found.   Genitourinary Comments: Severe cervical stenosis  Unable to place IUD   Neurological: She is alert and oriented to person, place, and time.   Skin: Skin is warm and dry.   Psychiatric: She has a normal mood and affect. Her behavior is normal. Judgment and thought content normal.   Nursing note and vitals reviewed.            Assessment/Plan  1) Severe cervical stenosis- IUD placement unsuccessful. Will pretreat with Cytotec 800 mcg po the night before placement and return in 1 week. Advised to use birth control until then ( she is still breastfeeding and does not have a cycle).  2) New pt history reviewed.        Radha was seen today for procedure.    Diagnoses and all orders for this " visit:    Screening for condition  -     POC Pregnancy, Urine    Other orders  -     misoprostol (CYTOTEC) 200 MCG tablet; 4 pills by mouth the evening before procedure          Shannan Gonzalez MD  9/18/19  8:46 PM

## 2019-09-18 NOTE — PROGRESS NOTES
Procedure: Intrauterine device insertion    Pre procedure indication 1) Desires Kyleena  Post procedure indication 1) Desires Kyleena    The risks, benefits, and alternatives to IUD were explained at length with the patient. All her questions were answered and consents were signed.  Urine pregnancy test was negative.  Patient is not on her cycle. She currently uses: Contraception: none    The patient was placed in a dorsal lithotomy position on the examining table in [facility]. A bimanual exam confirmed the uterus was normal in size, anteverted. A warmed metal speculum was inserted into the vagina and the cervix was brought into view.    The cervix was prepped with Betadine. The anterior lip was grasped with a single-tooth tenaculum. There was noted to be significant cervical stenosis. A dilator was used to try to find the endometrial canal but was not helpful. The procedure was abandoned.    Good hemostasis was noted.     All other instruments were removed from the vagina.   There were no complications.  The patient tolerated the procedure well with a minimal amount of discomfort.  We will premedicate with oral cytotec before trying insertion again.       Shannan Gonzalez MD    9/18/2019  2:12 PM

## 2019-09-22 PROBLEM — Z34.90 PREGNANCY: Status: RESOLVED | Noted: 2018-11-10 | Resolved: 2019-09-22

## 2019-09-25 ENCOUNTER — OFFICE VISIT (OUTPATIENT)
Dept: OBSTETRICS AND GYNECOLOGY | Facility: CLINIC | Age: 34
End: 2019-09-25

## 2019-09-25 VITALS
SYSTOLIC BLOOD PRESSURE: 132 MMHG | BODY MASS INDEX: 20.72 KG/M2 | HEIGHT: 67 IN | DIASTOLIC BLOOD PRESSURE: 70 MMHG | WEIGHT: 132 LBS

## 2019-09-25 DIAGNOSIS — Z30.430 ENCOUNTER FOR IUD INSERTION: Primary | ICD-10-CM

## 2019-09-25 PROCEDURE — 58300 INSERT INTRAUTERINE DEVICE: CPT | Performed by: OBSTETRICS & GYNECOLOGY

## 2019-09-25 NOTE — PROGRESS NOTES
Procedure: Intrauterine device insertion    Pre procedure indication 1) Desires Kyleena  Post procedure indication 1) Desires Kyleena    The risks, benefits, and alternatives to IUD were explained at length with the patient. All her questions were answered and consents were signed.  Urine pregnancy test was negative.  Patient is on her cycle. She currently uses: Contraception: condoms. She has not had sex since her last visit. She did take cytotec.     The patient was placed in a dorsal lithotomy position on the examining table in [facility]. A bimanual exam confirmed the uterus was normal in size, anteverted. A warmed metal speculum was inserted into the vagina and the cervix was brought into view.    The cervix was prepped with Betadine. The anterior lip was grasped with a single-tooth tenaculum. The endometrial cavity was then sounded to 6 cm WITH use of a dilator. The IUD was removed in a sterile fashion.    The  was then carefully advanced to the cervical canal into the uterus to the level of the fundus. This was then backed off about 1.5-2 cm to allow sufficient space for the arms to open. The device was deployed. The  was removed carefully from the uterus. The threads were then cut leaving 2-3 cm visible outside of the cervix.  The single-tooth tenaculum was removed from the anterior lip. Good hemostasis was noted.     All other instruments were removed from the vagina.   There were no complications.  The patient tolerated the procedure well with a minimal amount of discomfort.    The patient was counseled about the need to return in 4 weeks for string check and TVUS for position.      She was counseled about the need to use a backup method of contraception such as condoms for 1-2 weeks. The patient is counseled to contact us if she has any significant or increasing bleeding, pain, fever, chills, or other concerns. She is instructed to see a doctor right away if she believes that she may be  pregnant at any time with the IUD in place.    Shannan Gonzalez MD    9/25/2019  10:14 AM

## 2019-10-24 DIAGNOSIS — Z23 NEED FOR INFLUENZA VACCINATION: Primary | ICD-10-CM

## 2019-10-24 PROCEDURE — 90471 IMMUNIZATION ADMIN: CPT | Performed by: INTERNAL MEDICINE

## 2019-10-24 PROCEDURE — 90686 IIV4 VACC NO PRSV 0.5 ML IM: CPT | Performed by: INTERNAL MEDICINE

## 2019-10-30 ENCOUNTER — PROCEDURE VISIT (OUTPATIENT)
Dept: OBSTETRICS AND GYNECOLOGY | Facility: CLINIC | Age: 34
End: 2019-10-30

## 2019-10-30 ENCOUNTER — OFFICE VISIT (OUTPATIENT)
Dept: OBSTETRICS AND GYNECOLOGY | Facility: CLINIC | Age: 34
End: 2019-10-30

## 2019-10-30 VITALS — WEIGHT: 131 LBS | HEIGHT: 67 IN | BODY MASS INDEX: 20.56 KG/M2

## 2019-10-30 DIAGNOSIS — Z30.015 ENCOUNTER FOR INITIAL PRESCRIPTION OF VAGINAL RING HORMONAL CONTRACEPTIVE: ICD-10-CM

## 2019-10-30 DIAGNOSIS — Z30.431 IUD CHECK UP: Primary | ICD-10-CM

## 2019-10-30 DIAGNOSIS — T83.32XA MALPOSITIONED IUD, INITIAL ENCOUNTER: ICD-10-CM

## 2019-10-30 DIAGNOSIS — T83.32XA IUD MIGRATION, INITIAL ENCOUNTER: ICD-10-CM

## 2019-10-30 DIAGNOSIS — Z13.9 SCREENING FOR CONDITION: Primary | ICD-10-CM

## 2019-10-30 LAB
B-HCG UR QL: NEGATIVE
INTERNAL NEGATIVE CONTROL: NEGATIVE
INTERNAL POSITIVE CONTROL: POSITIVE
Lab: NORMAL

## 2019-10-30 PROCEDURE — 99213 OFFICE O/P EST LOW 20 MIN: CPT | Performed by: OBSTETRICS & GYNECOLOGY

## 2019-10-30 PROCEDURE — 58301 REMOVE INTRAUTERINE DEVICE: CPT | Performed by: OBSTETRICS & GYNECOLOGY

## 2019-10-30 PROCEDURE — 76830 TRANSVAGINAL US NON-OB: CPT | Performed by: OBSTETRICS & GYNECOLOGY

## 2019-10-30 PROCEDURE — 81025 URINE PREGNANCY TEST: CPT | Performed by: OBSTETRICS & GYNECOLOGY

## 2019-10-30 RX ORDER — ETONOGESTREL AND ETHINYL ESTRADIOL 11.7; 2.7 MG/1; MG/1
INSERT, EXTENDED RELEASE VAGINAL
Qty: 1 EACH | Refills: 11 | Status: SHIPPED | OUTPATIENT
Start: 2019-10-30

## 2019-10-30 NOTE — PROGRESS NOTES
"      Radha Diamond is a 34 y.o. patient who presents for follow up of   Chief Complaint   Patient presents with   • Follow-up         33 yo est pt here for Kyleena IUD check. She has not had any issues and can feel the string. Her insertion was very difficult and so a TVUS was ordered today for position. It shows a 6.5 cm uterus, El 0.5 cm and the IUD is perpendicular to the ALEXA. We discussed that this needs removal and she is agreeable. I offered to replace one under US guidance but she is interested in the Nuvaring instead. She is starting to wean and can start Nuvaring now and use condoms for the first month.     The following portions of the patient's history were reviewed and updated as appropriate: allergies, current medications and problem list.    Review of Systems   Genitourinary: Negative for dyspareunia, pelvic pain, vaginal bleeding and vaginal pain.   Musculoskeletal: Negative for back pain.   All other systems reviewed and are negative.      Ht 170.2 cm (67.01\")   Wt 59.4 kg (131 lb)   Breastfeeding? No   BMI 20.51 kg/m²     Physical Exam   Constitutional: She is oriented to person, place, and time. She appears well-developed and well-nourished.   HENT:   Head: Normocephalic and atraumatic.   Eyes: Conjunctivae are normal. No scleral icterus.   Genitourinary: Vagina normal and uterus normal. Pelvic exam was performed with patient supine. There is no rash, tenderness, lesion or injury on the right labia. There is no rash, tenderness, lesion or injury on the left labia. Cervix exhibits no motion tenderness, no discharge and no friability. Right adnexum displays no mass, no tenderness and no fullness. Left adnexum displays no mass, no tenderness and no fullness.   Genitourinary Comments: SEE IUD REMOVAL NOTE   Neurological: She is alert and oriented to person, place, and time.   Skin: Skin is warm and dry.   Psychiatric: She has a normal mood and affect. Her behavior is normal. Judgment and thought " content normal.   Nursing note and vitals reviewed.      A/P:  1. Malpositioned IUD- IUD removed without difficulty.   2. CS- Rx Nuvaring. Start now and use BUBC x 1 month.     Assessment/Plan   Radha was seen today for follow-up.    Diagnoses and all orders for this visit:    Screening for condition  -     POC Pregnancy, Urine    Encounter for initial prescription of vaginal ring hormonal contraceptive    Malpositioned IUD, initial encounter    Other orders  -     etonogestrel-ethinyl estradiol (NUVARING) 0.12-0.015 MG/24HR vaginal ring; Insert vaginally and leave in place for 3 consecutive weeks, then remove for 1 week.                 No Follow-up on file.      Shannan Gonzalez MD    10/30/2019  10:59 AM

## 2019-10-30 NOTE — PROGRESS NOTES
IUD Removal Procedure Note    Type of IUD:  Kyleena  Date of insertion:  known  Reason for removal:  malpositioned  Other relevant history/information:  none  UPT: negative    Procedure Time Out Documentation      Procedure Details  IUD strings visible:  yes  Local anesthesia:  None  Tenaculum used:  None  Removal:  IUD strings grasped and IUD removed intact with gentle traction.  The patient tolerated the procedure well.    All appropriate instructions regarding removal were reviewed.    Tolerated well  No apparent complications  Post procedure diagnosis : IUD removal     Plans for contraception:  NuvaRing vaginal inserts    Other follow-up needed:  none    The patient was advised to call for any fever or for prolonged or severe pain or bleeding. She was advised to use OTC ibuprofen as needed for mild to moderate pain.       Shannan Gonzalez MD    10/30/2019  10:55 AM

## 2020-08-22 ENCOUNTER — RESULTS ENCOUNTER (OUTPATIENT)
Dept: INTERNAL MEDICINE | Facility: CLINIC | Age: 35
End: 2020-08-22

## 2020-08-22 DIAGNOSIS — R11.2 NAUSEA AND VOMITING, INTRACTABILITY OF VOMITING NOT SPECIFIED, UNSPECIFIED VOMITING TYPE: ICD-10-CM
